# Patient Record
Sex: FEMALE | Race: WHITE | NOT HISPANIC OR LATINO | Employment: OTHER | URBAN - METROPOLITAN AREA
[De-identification: names, ages, dates, MRNs, and addresses within clinical notes are randomized per-mention and may not be internally consistent; named-entity substitution may affect disease eponyms.]

---

## 2017-04-11 ENCOUNTER — OFFICE VISIT (OUTPATIENT)
Dept: NEUROLOGY | Facility: CLINIC | Age: 82
End: 2017-04-11
Payer: MEDICARE

## 2017-04-11 ENCOUNTER — HOSPITAL ENCOUNTER (OUTPATIENT)
Dept: PULMONOLOGY | Facility: HOSPITAL | Age: 82
Discharge: HOME | End: 2017-04-11
Attending: NURSE PRACTITIONER
Payer: MEDICARE

## 2017-04-11 ENCOUNTER — HOSPITAL ENCOUNTER (OUTPATIENT)
Dept: RADIOLOGY | Facility: HOSPITAL | Age: 82
Discharge: HOME | End: 2017-04-11
Attending: NURSE PRACTITIONER
Payer: MEDICARE

## 2017-04-11 VITALS
RESPIRATION RATE: 16 BRPM | HEART RATE: 64 BPM | DIASTOLIC BLOOD PRESSURE: 82 MMHG | WEIGHT: 162.06 LBS | SYSTOLIC BLOOD PRESSURE: 124 MMHG | BODY MASS INDEX: 29.82 KG/M2 | HEIGHT: 62 IN

## 2017-04-11 DIAGNOSIS — R20.0 NUMBNESS OF RIGHT HAND: ICD-10-CM

## 2017-04-11 DIAGNOSIS — M54.2 NECK PAIN: ICD-10-CM

## 2017-04-11 DIAGNOSIS — R55 SYNCOPE, UNSPECIFIED SYNCOPE TYPE: ICD-10-CM

## 2017-04-11 DIAGNOSIS — R29.6 FALLS FREQUENTLY: ICD-10-CM

## 2017-04-11 DIAGNOSIS — M54.5 LOW BACK PAIN, UNSPECIFIED BACK PAIN LATERALITY, UNSPECIFIED CHRONICITY, WITH SCIATICA PRESENCE UNSPECIFIED: ICD-10-CM

## 2017-04-11 DIAGNOSIS — R55 SYNCOPE, UNSPECIFIED SYNCOPE TYPE: Primary | ICD-10-CM

## 2017-04-11 DIAGNOSIS — G25.81 RESTLESS LEG SYNDROME: ICD-10-CM

## 2017-04-11 DIAGNOSIS — R42 DIZZINESS: ICD-10-CM

## 2017-04-11 PROCEDURE — 99999 PR PBB SHADOW E&M-EST. PATIENT-LVL III: CPT | Mod: PBBFAC,,, | Performed by: NURSE PRACTITIONER

## 2017-04-11 PROCEDURE — 72120 X-RAY BEND ONLY L-S SPINE: CPT | Mod: 26,,, | Performed by: RADIOLOGY

## 2017-04-11 PROCEDURE — 93227 XTRNL ECG REC<48 HR R&I: CPT | Mod: ,,, | Performed by: INTERNAL MEDICINE

## 2017-04-11 PROCEDURE — 72040 X-RAY EXAM NECK SPINE 2-3 VW: CPT | Mod: 26,,, | Performed by: RADIOLOGY

## 2017-04-11 PROCEDURE — 72120 X-RAY BEND ONLY L-S SPINE: CPT | Mod: TC

## 2017-04-11 PROCEDURE — 72100 X-RAY EXAM L-S SPINE 2/3 VWS: CPT | Mod: 26,,, | Performed by: RADIOLOGY

## 2017-04-11 PROCEDURE — 93225 XTRNL ECG REC<48 HRS REC: CPT

## 2017-04-11 PROCEDURE — 72040 X-RAY EXAM NECK SPINE 2-3 VW: CPT | Mod: TC

## 2017-04-11 RX ORDER — GLIMEPIRIDE 2 MG/1
TABLET ORAL
Refills: 5 | COMMUNITY
Start: 2017-03-31

## 2017-04-11 RX ORDER — CYANOCOBALAMIN 1000 UG/ML
INJECTION, SOLUTION INTRAMUSCULAR; SUBCUTANEOUS
Refills: 0 | Status: ON HOLD | COMMUNITY
Start: 2017-01-02 | End: 2020-02-03 | Stop reason: HOSPADM

## 2017-04-11 RX ORDER — DIPHENOXYLATE HYDROCHLORIDE AND ATROPINE SULFATE 2.5; .025 MG/1; MG/1
TABLET ORAL
Refills: 0 | Status: ON HOLD | COMMUNITY
Start: 2017-03-28 | End: 2020-02-03 | Stop reason: HOSPADM

## 2017-04-11 RX ORDER — BUPROPION HYDROCHLORIDE 150 MG/1
TABLET ORAL
Refills: 5 | COMMUNITY
Start: 2017-03-07

## 2017-04-11 RX ORDER — SULFAMETHOXAZOLE AND TRIMETHOPRIM 800; 160 MG/1; MG/1
0.5 TABLET ORAL DAILY
Refills: 0 | Status: ON HOLD | COMMUNITY
Start: 2017-03-31 | End: 2020-02-03 | Stop reason: HOSPADM

## 2017-04-11 RX ORDER — METFORMIN HYDROCHLORIDE 500 MG/1
TABLET ORAL
Refills: 5 | COMMUNITY
Start: 2017-03-31

## 2017-04-11 RX ORDER — SIMVASTATIN 20 MG/1
20 TABLET, FILM COATED ORAL NIGHTLY
Refills: 5 | COMMUNITY
Start: 2017-03-31

## 2017-04-11 RX ORDER — METOPROLOL SUCCINATE 50 MG/1
TABLET, EXTENDED RELEASE ORAL
Refills: 5 | COMMUNITY
Start: 2017-03-31

## 2017-04-11 RX ORDER — OXYBUTYNIN CHLORIDE 5 MG/1
5 TABLET ORAL 2 TIMES DAILY
Refills: 5 | COMMUNITY
Start: 2017-03-31

## 2017-04-11 NOTE — LETTER
April 11, 2017      Rosendo Lawrence MD  [address] [facility]  [place] [address]           [place] [facility]  [address] [facility] [address]  Phone: [phone]  Fax: [phone]          Patient: Sandy Uriarte   MR Number: [id number]   YOB: 1934   Date of Visit: 4/11/2017       Dear Dr. Rosendo Lawrence:    Thank you for referring Sandy Uriarte to me for evaluation. Attached you will find relevant portions of my assessment and plan of care.    If you have questions, please do not hesitate to call me. I look forward to following Sandy Uriarte along with you.    Sincerely,    Lida Zhou, HERBERTH    Enclosure  CC:  No Recipients    If you would like to receive this communication electronically, please contact [email or web address] or [phone] to request more information on [employer] access.    For providers and/or their staff who would like to refer a patient to [facility], please contact us through our one-stop-shop provider referral line, [facility], at [phone].    If you feel you have received this communication in error or would no longer like to receive these types of communications, please e-mail [email or web address]

## 2017-04-11 NOTE — MR AVS SNAPSHOT
[place] [facility]  [address] [facility] [address]  Phone: [phone]  Fax: [phone]                  Sandy Uriarte   2017 1:40 PM   Office Visit    Description:  Female : 1934   Provider:  Lida Zhou NP   Department:  [place] [facility]           Reason for Visit     Peripheral Neuropathy     Loss of Consciousness     Dizziness           Diagnoses this Visit        Comments    Syncope, unspecified syncope type    -  Primary     Dizziness         Neck pain         Falls frequently         Restless leg syndrome         Numbness of right hand         Low back pain, unspecified back pain laterality, unspecified chronicity, with sciatica presence unspecified                To Do List           Goals (5 Years of Data)     None      [facility]     [facility] On Call Nurse Care Line -  Assistance  Unless otherwise directed by your provider, please contact [facility], our nurse care line that is available for  assistance.     Registered nurses in the [facility] Center provide: appointment scheduling, clinical advisement, health education, and other advisory services.  Call: [phone] (toll free)               Medications                Verify that the below list of medications is an accurate representation of the medications you are currently taking.  If none reported, the list may be blank. If incorrect, please contact your healthcare provider. Carry this list with you in case of emergency.           Current Medications     buPROPion (WELLBUTRIN XL) 150 MG TB24 tablet 1 daily for mood    cyanocobalamin 1,000 mcg/mL injection Inject 1 mL every 2 weeks by intramuscular route as directed.    glimepiride (AMARYL) 2 MG tablet Take 1 tablet(s) every day by oral route.    metformin (GLUCOPHAGE) 500 MG tablet take one-half tablet by mouth 4 times daily for diabetes    metoprolol succinate (TOPROL-XL) 50 MG 24 hr tablet take one and one-half tablet  "by mouth daily for high blood pressure    oxybutynin (DITROPAN) 5 MG Tab Take 5 mg by mouth 2 (two) times daily.    simvastatin (ZOCOR) 20 MG tablet one tablet by mouth daily    sulfamethoxazole-trimethoprim 800-160mg (BACTRIM DS) 800-160 mg Tab Take 0.5 tablets by mouth once daily.     diphenoxylate-atropine 2.5-0.025 mg (LOMOTIL) 2.5-0.025 mg per tablet TAKE ONE TABLET BY MOUTH FOUR TIMES DAILY AS NEEDED LOOSE BOWELS           Clinical Reference Information           Your Vitals Were     BP Pulse Resp Height Weight BMI    124/82 (BP Location: Left arm, Patient Position: Sitting, BP Method: Manual) 64 16 5' 2" (1.575 m) 73.5 kg (162 lb 0.6 oz) 29.64 kg/m2      Blood Pressure          Most Recent Value    BP  124/82      Allergies as of 4/11/2017     No Known Allergies      Immunizations Administered on Date of Encounter - 4/11/2017     None      Orders Placed During Today's Visit     Future Labs/Procedures Expected by Expires    Creatinine, serum  4/11/2017 6/10/2018    MRI Brain W WO Contrast  4/11/2017 4/11/2018    X-Ray Cervical Spine AP And Lateral  4/11/2017 4/11/2018    X-Ray Lumbar Spine Ap Lateral w/Flex Ext  4/11/2017 4/11/2018    EMG - 4 Extremities  As directed 4/11/2018    Holter monitor - 48 hour  As directed 4/11/2018    Nerve conduction test  As directed 4/12/2018      [facility] Sign-Up     Activating your [facility] account is as easy as 1-2-3!     1) Visit [email or web address], select Sign Up Now, enter this activation code and your date of birth, then select Next.  [email or web address]  Expires: 5/26/2017  2:37 PM      2) Create a username and password to use when you visit [facility] in the future and select a security question in case you lose your password and select Next.    3) Enter your e-mail address and click Sign Up!    Additional Information  If you have questions, please e-mail [email or web address] or call [phone] to talk to our [facility] staff. Remember, [facility] is NOT to be used for " urgent needs. For medical emergencies, dial 911.         Language Assistance Services     ATTENTION: Language assistance services are available, free of charge. Please call [phone].      ATENCIÓN: Si habla severo, tiene a tijerina disposición servicios gratuitos de asistencia lingüística. Llame al [phone].     CHÚ Ý: N?u b?n nói Ti?ng Vi?t, có các d?ch v? h? tr? ngôn ng? mi?n phí dành cho b?n. G?i s? [phone].         [place] [facility] complies with applicable Federal civil rights laws and does not discriminate on the basis of race, color, national origin, age, disability, or sex.

## 2017-04-11 NOTE — PROGRESS NOTES
"Subjective:      Chief Complaint:  Syncope    History of Present Illness  Sandy Uriarte is a 82 y.o. female, referred by Mckenna Ward NP, for evaluation of possible neuropathy, dizziness, and syncope. She has a history of HTN, hyperlipidemia, and thyroid disease.     Her first syncopal episode was in 11/2016, and her second syncopal episode occurred a couple months ago. She became dizzy, fell down, and then "woke up". She denies any bowel or bladder loss, but admits to having confusion afterwards. She denies any palpitations. She denies any dimming of her vision with her dizziness, but does have reduced hearing. One of her episodes occurred after bending over, and her second episode occurred after she finished having a BM and stood up.      While she has only had two syncopal episodes, she becomes dizzy 4-5 days per week. Her dizziness lasts for a couple minutes at a time.     She reports to having jumping to her legs at night, which began in 2012. She denies having any treatment for this. This does keep her awake at night. She has a history of a lumbar surgery in the past, and experiences intermittent lumbar complaints. Her lumbar complaints are worse with twisting motions. She admits to having burning to her feet and reports that she was diagnosed with neuropathy by Dr. Lawrence via a scan in the office.     She does admit to having some neck pain, which began after falling against the wall a couple months ago. She denies any radiation into her BUE, but does admit to dropping items with her right hand.     I have reviewed all of this patient's past medical and surgical histories as well as family and social histories and active allergies and medications as documented in the electronic medical record.    Review of Systems  Review of Systems   Constitutional: Positive for fatigue.   Eyes: Negative for visual disturbance.   Respiratory: Positive for shortness of breath.    Cardiovascular: Negative for palpitations " and leg swelling.   Musculoskeletal: Positive for back pain.   Skin: Negative for rash.   Neurological: Positive for dizziness, tremors, weakness and headaches. Negative for speech difficulty and numbness.   Psychiatric/Behavioral: Positive for dysphoric mood and sleep disturbance. The patient is nervous/anxious.        Objective:       Vitals:    04/11/17 1407   BP: 124/82   Pulse: 64   Resp: 16     Exam:  Gen Appearance, well developed/nourished in no apparent distress  CV: 2+ distal pulses with no edema or swelling  Neuro:  MS: Awake, alert, oriented to place, person, time, situation. Sustains attention. Recent/remote memory intact, Language is full to spontaneous speech/repetition/naming/comprehension. Fund of Knowledge is full  CN: Optic discs are flat with normal vasculature, PERRL, Extraoccular movements and visual fields are full. Normal facial sensation and strength, Hearing symmetric, Tongue and Palate are midline and strong. Shoulder Shrug symmetric and strong.  Motor: Normal bulk, tone, no abnormal movements. 5/5 strength bilateral upper/lower extremities with 2+ reflexes and bilateral plantar response  Sensory: symmetric to light touch, pain, temp, and vibration/proprioception. Romberg negative  Cerebellar: Finger-nose,Heal-shin, Rapid alternating movements intact  Gait: Normal stance, no ataxia    Imaging: no neuroimaging to review  Labs: no recent labs to review; requested recent labs done by PCP.     Assessment:   Sandy Uriarte is a 82 y.o. female, referred by Mckenna Ward NP, for evaluation of possible neuropathy, dizziness, and syncope. She has a history of HTN, hyperlipidemia, and thyroid disease.     Her presentation of her syncopal episodes is suspicious for orthostatic hypotension, vasovagally mediated episodes, as well as for possible autonomic neuropathy or arhythmia.     Plan:     I recommend:   1. MRI Brain with and without contrast to assess for structural abnormalities.   2. 48 hour  Holter to assess for arhythmia.   3. Request labs from PCP, and check a TSH, B12, and Iron Studies, given her dizziness, gait imbalance, and RLS complaints. If her labs are unrevealing, I will consider medication for her RLS; however, if her syncope/dizziness is orthostatic in nature, this could be worsened by dopamine agonists.   4. C-spine X-rays and L-spine X-rays to assess for structural abnormalities.   5. EMG/NCS 4 extremities.     Follow up to be determined at EMG.     CC: Mckenna Ward NP and Dr. Rosendo Lawrence

## 2017-04-18 ENCOUNTER — HOSPITAL ENCOUNTER (OUTPATIENT)
Dept: RADIOLOGY | Facility: HOSPITAL | Age: 82
Discharge: HOME | End: 2017-04-18
Attending: NURSE PRACTITIONER
Payer: MEDICARE

## 2017-04-18 DIAGNOSIS — R55 SYNCOPE, UNSPECIFIED SYNCOPE TYPE: ICD-10-CM

## 2017-04-18 DIAGNOSIS — R42 DIZZINESS: ICD-10-CM

## 2017-04-18 PROCEDURE — 70553 MRI BRAIN STEM W/O & W/DYE: CPT | Mod: TC

## 2017-04-18 PROCEDURE — 25500020 PHARM REV CODE 255: Performed by: NURSE PRACTITIONER

## 2017-04-18 PROCEDURE — 70553 MRI BRAIN STEM W/O & W/DYE: CPT | Mod: 26,,, | Performed by: RADIOLOGY

## 2017-04-18 PROCEDURE — A9585 GADOBUTROL INJECTION: HCPCS | Performed by: NURSE PRACTITIONER

## 2017-04-18 RX ORDER — GADOBUTROL 604.72 MG/ML
7 INJECTION INTRAVENOUS
Status: COMPLETED | OUTPATIENT
Start: 2017-04-18 | End: 2017-04-18

## 2017-04-18 RX ADMIN — GADOBUTROL 7 ML: 604.72 INJECTION INTRAVENOUS at 02:04

## 2017-05-12 ENCOUNTER — PROCEDURE VISIT (OUTPATIENT)
Dept: NEUROLOGY | Facility: CLINIC | Age: 82
End: 2017-05-12
Payer: MEDICARE

## 2017-05-12 DIAGNOSIS — R29.6 FALLS FREQUENTLY: ICD-10-CM

## 2017-05-12 DIAGNOSIS — I69.90 LATE EFFECTS OF CVA (CEREBROVASCULAR ACCIDENT): Primary | ICD-10-CM

## 2017-05-12 DIAGNOSIS — G56.03 BILATERAL CARPAL TUNNEL SYNDROME: ICD-10-CM

## 2017-05-12 DIAGNOSIS — G25.81 RESTLESS LEG SYNDROME: ICD-10-CM

## 2017-05-12 DIAGNOSIS — M54.2 NECK PAIN: ICD-10-CM

## 2017-05-12 DIAGNOSIS — M54.5 LOW BACK PAIN, UNSPECIFIED BACK PAIN LATERALITY, UNSPECIFIED CHRONICITY, WITH SCIATICA PRESENCE UNSPECIFIED: ICD-10-CM

## 2017-05-12 DIAGNOSIS — R20.0 NUMBNESS OF RIGHT HAND: ICD-10-CM

## 2017-05-12 DIAGNOSIS — I67.2 CEREBRAL ATHEROSCLEROSIS: ICD-10-CM

## 2017-05-12 DIAGNOSIS — E11.42 DIABETIC POLYNEUROPATHY ASSOCIATED WITH TYPE 2 DIABETES MELLITUS: ICD-10-CM

## 2017-05-12 PROCEDURE — 95864 NEEDLE EMG 4 EXTREMITIES: CPT | Mod: PBBFAC | Performed by: PSYCHIATRY & NEUROLOGY

## 2017-05-12 PROCEDURE — 95913 NRV CNDJ TEST 13/> STUDIES: CPT | Mod: 26,S$PBB | Performed by: PSYCHIATRY & NEUROLOGY

## 2017-05-12 NOTE — PROCEDURES
EMG - 4 Extremities  Date/Time: 5/12/2017 2:32 PM  Performed by: EDUARDO JENNINGS  Authorized by: APRIL LAYNE     Nerve conduction test  Date/Time: 5/12/2017 2:32 PM  Performed by: EDUARDO JENNINGS  Authorized by: APRIL LAYNE       REPORT OF EMG and NERVE CONDUCTION STUDY    Name: Sandy Uriarte  Date of Study:  5/12/17  Referring Provider: JARRED Layne  Test Performed by:  MD Jose  Full Values Attached  Informed Consent Scanned.   No anesthesia used.   Amount of Blood Loss: none. The patient tolerated this procedure well.       Informed consent was obtained prior to performing this study. Two patient identifiers were confirmed with the patient prior to performing this study. A time out to determine correct patient and and agreement on procedure performed was conducted prior to the concentric needle examination.    Reason for the study:  Numb feet and hands, poor balance      Findings:   Nerve conduction studies of the right  median (motor and sensory)nerves, left median sensory nerve,right ulnar (motor and sensory) nerves, right radial sensory nerve, bilateral peroneal motor, bilateral tibial motor, bilateral sural nerves and bilateral H-reflexes were performed. Median palm to wrist was mildly prolonged to 2.6ms on the right and 2.7ms on the left.  Amplitudes were reduced without conduction block throughout in the legs more than hands. However, distal latencies, conduction velocities were more preserved. H reflexes were absent  EMG of selected muscles of the bilateral arms and bilateral legs were performed as indicated on the attached sheets.  Lumbar paraspinal muscle sampling was not done due to scar from prior surgery and cervical paraspinal muscle sampling was not done as patient could not well cooperate/ could not be appropriately positioned. This part of the test was somewhat limited due to patient's difficulty with cooperation.  However,   Insertional activity was normal without  fasciculation or fibrillation, normal sized and phasia of motor units.      Impression:  Abnormal Study secondary to the Presence of:    1. 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 Mild Bilateral Carpal Tunnel Syndrome  3. This study was not adequate to rule out radicular disease due to limitations noted above. Clinical and imaging correlation suggested    Jude Garcia M.D. [facility].     Recommendations (reviewed at this visit)  1. She will continue with efforts on DM control. She never reported for B12 level or ferritin level as she states she takes B12 and her RLS is improved. She states pain is improved and she has not had any further syncope. For her imbalance due in part to neuropathy, I recommend home health with PT consult. She declines. Suggested she use a walker at all times  2. Wear Brace for CTS nightly PRN CTS  3. I reviewed her large amount of left frontal/parietal atrophy on MRI. Etiology is not clear. Stroke is in differential. She recalls a spell 1.5 years ago of aphasia after a fall which could be related. Check Carotid US and Echo/ continue ASA started since change on MRI noted. This could also contribute to poor balance  RTC 12 weeks.

## 2017-05-12 NOTE — MR AVS SNAPSHOT
[place] [facility]  [address] [facility] [address]  Phone: [phone]  Fax: [phone]                  Sandy Uriarte   2017 2:00 PM   Procedure visit    Description:  Female : 1934   Provider:  Jude Garcia MD   Department:  [place] [facility]           Diagnoses this Visit        Comments    Late effects of CVA (cerebrovascular accident)    -  Primary     Falls frequently         Numbness of right hand         Low back pain, unspecified back pain laterality, unspecified chronicity, with sciatica presence unspecified         Cerebral atherosclerosis                To Do List           Goals (5 Years of Data)     None      Follow-Up and Disposition     Return in about 3 months (around 2017).      [facility] On Call     [facility] On Call Nurse Care Line -  Assistance  Unless otherwise directed by your provider, please contact [facility], our nurse care line that is available for  assistance.     Registered nurses in the [facility] On Call Center provide: appointment scheduling, clinical advisement, health education, and other advisory services.  Call: [phone] (toll free)               Medications                Verify that the below list of medications is an accurate representation of the medications you are currently taking.  If none reported, the list may be blank. If incorrect, please contact your healthcare provider. Carry this list with you in case of emergency.           Current Medications     buPROPion (WELLBUTRIN XL) 150 MG TB24 tablet 1 daily for mood    cyanocobalamin 1,000 mcg/mL injection Inject 1 mL every 2 weeks by intramuscular route as directed.    diphenoxylate-atropine 2.5-0.025 mg (LOMOTIL) 2.5-0.025 mg per tablet TAKE ONE TABLET BY MOUTH FOUR TIMES DAILY AS NEEDED LOOSE BOWELS    glimepiride (AMARYL) 2 MG tablet Take 1 tablet(s) every day by oral route.    metformin (GLUCOPHAGE) 500 MG tablet take one-half tablet by mouth 4  times daily for diabetes    metoprolol succinate (TOPROL-XL) 50 MG 24 hr tablet take one and one-half tablet by mouth daily for high blood pressure    oxybutynin (DITROPAN) 5 MG Tab Take 5 mg by mouth 2 (two) times daily.    simvastatin (ZOCOR) 20 MG tablet one tablet by mouth daily    sulfamethoxazole-trimethoprim 800-160mg (BACTRIM DS) 800-160 mg Tab Take 0.5 tablets by mouth once daily.            Clinical Reference Information           Allergies as of 5/12/2017     No Known Allergies      Immunizations Administered on Date of Encounter - 5/12/2017     None      Orders Placed During Today's Visit      Normal Orders This Visit    EMG - 4 Extremities     Nerve conduction test     Future Labs/Procedures Expected by Expires    US Carotid Bilateral  5/12/2017 5/12/2018    2D echo with color flow doppler and bubble contrast  As directed 5/12/2018      Language Assistance Services     ATTENTION: Language assistance services are available, free of charge. Please call [phone].      ATENCIÓN: Si habla severo, tiene a tijerina disposición servicios gratuitos de asistencia lingüística. Llame al [phone].     [facility] Ý: N?u b?n nói Ti?ng Vi?t, có các d?ch v? h? tr? ngôn ng? mi?n phí dành cho b?n. G?i s? [phone].         [place] [facility] complies with applicable Federal civil rights laws and does not discriminate on the basis of race, color, national origin, age, disability, or sex.

## 2017-05-22 ENCOUNTER — HOSPITAL ENCOUNTER (OUTPATIENT)
Dept: RADIOLOGY | Facility: HOSPITAL | Age: 82
Discharge: HOME | End: 2017-05-22
Attending: PSYCHIATRY & NEUROLOGY
Payer: MEDICARE

## 2017-05-22 ENCOUNTER — HOSPITAL ENCOUNTER (OUTPATIENT)
Dept: PULMONOLOGY | Facility: HOSPITAL | Age: 82
Discharge: HOME | End: 2017-05-22
Attending: PSYCHIATRY & NEUROLOGY
Payer: MEDICARE

## 2017-05-22 DIAGNOSIS — I67.2 CEREBRAL ATHEROSCLEROSIS: ICD-10-CM

## 2017-05-22 DIAGNOSIS — I69.90 LATE EFFECTS OF CVA (CEREBROVASCULAR ACCIDENT): ICD-10-CM

## 2017-05-22 DIAGNOSIS — I51.7 CARDIOMEGALY: ICD-10-CM

## 2017-05-22 DIAGNOSIS — R93.1 ABNORMAL ECHOCARDIOGRAM: Primary | ICD-10-CM

## 2017-05-22 LAB
DIASTOLIC DYSFUNCTION: NO
MITRAL VALVE MOBILITY: NORMAL
RETIRED EF AND QEF - SEE NOTES: 55 (ref 55–65)

## 2017-05-22 PROCEDURE — 96374 THER/PROPH/DIAG INJ IV PUSH: CPT

## 2017-05-22 PROCEDURE — 93306 TTE W/DOPPLER COMPLETE: CPT | Mod: 26,,, | Performed by: INTERNAL MEDICINE

## 2017-05-22 PROCEDURE — 93880 EXTRACRANIAL BILAT STUDY: CPT | Mod: TC

## 2017-05-22 PROCEDURE — 93880 EXTRACRANIAL BILAT STUDY: CPT | Mod: 26,,, | Performed by: RADIOLOGY

## 2018-01-05 ENCOUNTER — HOSPITAL ENCOUNTER (OUTPATIENT)
Dept: RADIOLOGY | Facility: HOSPITAL | Age: 83
Discharge: HOME | End: 2018-01-05
Attending: FAMILY MEDICINE
Payer: MEDICARE

## 2018-01-05 DIAGNOSIS — R41.3 MEMORY IMPAIRMENT: ICD-10-CM

## 2018-01-05 PROCEDURE — 25500020 PHARM REV CODE 255: Performed by: FAMILY MEDICINE

## 2018-01-05 PROCEDURE — A9585 GADOBUTROL INJECTION: HCPCS | Performed by: FAMILY MEDICINE

## 2018-01-05 PROCEDURE — 70553 MRI BRAIN STEM W/O & W/DYE: CPT | Mod: 26,,, | Performed by: RADIOLOGY

## 2018-01-05 PROCEDURE — 70553 MRI BRAIN STEM W/O & W/DYE: CPT | Mod: TC

## 2018-01-05 RX ORDER — GADOBUTROL 604.72 MG/ML
7 INJECTION INTRAVENOUS
Status: COMPLETED | OUTPATIENT
Start: 2018-01-05 | End: 2018-01-05

## 2018-01-05 RX ADMIN — GADOBUTROL 7 ML: 604.72 INJECTION INTRAVENOUS at 01:01

## 2020-01-31 ENCOUNTER — HOSPITAL ENCOUNTER (INPATIENT)
Facility: HOSPITAL | Age: 85
LOS: 3 days | Discharge: HOME HEALTH | DRG: 281 | End: 2020-02-03
Attending: HOSPITALIST | Admitting: HOSPITALIST
Payer: MEDICARE

## 2020-01-31 DIAGNOSIS — I21.4 NSTEMI (NON-ST ELEVATED MYOCARDIAL INFARCTION): ICD-10-CM

## 2020-01-31 DIAGNOSIS — K52.9 ACUTE COLITIS: ICD-10-CM

## 2020-01-31 DIAGNOSIS — G45.9 TIA (TRANSIENT ISCHEMIC ATTACK): Primary | ICD-10-CM

## 2020-01-31 DIAGNOSIS — R55 SYNCOPE, UNSPECIFIED SYNCOPE TYPE: ICD-10-CM

## 2020-01-31 DIAGNOSIS — R42 DIZZINESS: ICD-10-CM

## 2020-01-31 PROBLEM — R93.5 ABNORMAL CT OF THE ABDOMEN: Status: ACTIVE | Noted: 2020-01-31

## 2020-01-31 PROBLEM — E07.9 THYROID DYSFUNCTION: Status: ACTIVE | Noted: 2020-01-31

## 2020-01-31 PROBLEM — K22.4 ESOPHAGEAL DYSMOTILITY: Status: ACTIVE | Noted: 2020-01-31

## 2020-01-31 PROBLEM — I10 ESSENTIAL HYPERTENSION: Status: ACTIVE | Noted: 2020-01-31

## 2020-01-31 PROBLEM — E11.9 DIABETES MELLITUS, TYPE 2: Status: ACTIVE | Noted: 2020-01-31

## 2020-01-31 LAB
ABO + RH BLD: NORMAL
ANION GAP SERPL CALC-SCNC: 13 MMOL/L (ref 8–16)
BASOPHILS # BLD AUTO: 0.05 K/UL (ref 0–0.2)
BASOPHILS NFR BLD: 0.4 % (ref 0–1.9)
BLD GP AB SCN CELLS X3 SERPL QL: NORMAL
BNP SERPL-MCNC: 492 PG/ML (ref 0–99)
BUN SERPL-MCNC: 8 MG/DL (ref 8–23)
CALCIUM SERPL-MCNC: 8.4 MG/DL (ref 8.7–10.5)
CHLORIDE SERPL-SCNC: 102 MMOL/L (ref 95–110)
CHOLEST SERPL-MCNC: 77 MG/DL (ref 120–199)
CHOLEST/HDLC SERPL: 2.4 {RATIO} (ref 2–5)
CO2 SERPL-SCNC: 21 MMOL/L (ref 23–29)
CREAT SERPL-MCNC: 0.8 MG/DL (ref 0.5–1.4)
DIFFERENTIAL METHOD: ABNORMAL
EOSINOPHIL # BLD AUTO: 0.1 K/UL (ref 0–0.5)
EOSINOPHIL NFR BLD: 0.6 % (ref 0–8)
ERYTHROCYTE [DISTWIDTH] IN BLOOD BY AUTOMATED COUNT: 12.8 % (ref 11.5–14.5)
ERYTHROCYTE [SEDIMENTATION RATE] IN BLOOD BY WESTERGREN METHOD: 24 MM/HR (ref 0–20)
EST. GFR  (AFRICAN AMERICAN): >60 ML/MIN/1.73 M^2
EST. GFR  (NON AFRICAN AMERICAN): >60 ML/MIN/1.73 M^2
GLUCOSE SERPL-MCNC: 136 MG/DL (ref 70–110)
HCT VFR BLD AUTO: 34.5 % (ref 37–48.5)
HDLC SERPL-MCNC: 32 MG/DL (ref 40–75)
HDLC SERPL: 41.6 % (ref 20–50)
HGB BLD-MCNC: 11.7 G/DL (ref 12–16)
IMM GRANULOCYTES # BLD AUTO: 0.16 K/UL (ref 0–0.04)
IMM GRANULOCYTES NFR BLD AUTO: 1.2 % (ref 0–0.5)
LDLC SERPL CALC-MCNC: 17.2 MG/DL (ref 63–159)
LYMPHOCYTES # BLD AUTO: 3.2 K/UL (ref 1–4.8)
LYMPHOCYTES NFR BLD: 24.7 % (ref 18–48)
MCH RBC QN AUTO: 28.8 PG (ref 27–31)
MCHC RBC AUTO-ENTMCNC: 33.9 G/DL (ref 32–36)
MCV RBC AUTO: 85 FL (ref 82–98)
MONOCYTES # BLD AUTO: 1 K/UL (ref 0.3–1)
MONOCYTES NFR BLD: 7.3 % (ref 4–15)
NEUTROPHILS # BLD AUTO: 8.6 K/UL (ref 1.8–7.7)
NEUTROPHILS NFR BLD: 65.8 % (ref 38–73)
NONHDLC SERPL-MCNC: 45 MG/DL
NRBC BLD-RTO: 0 /100 WBC
PLATELET # BLD AUTO: 195 K/UL (ref 150–350)
PMV BLD AUTO: 12.6 FL (ref 9.2–12.9)
POCT GLUCOSE: 156 MG/DL (ref 70–110)
POTASSIUM SERPL-SCNC: 3.7 MMOL/L (ref 3.5–5.1)
RBC # BLD AUTO: 4.06 M/UL (ref 4–5.4)
SODIUM SERPL-SCNC: 136 MMOL/L (ref 136–145)
T4 FREE SERPL-MCNC: 1.22 NG/DL (ref 0.71–1.51)
TRIGL SERPL-MCNC: 139 MG/DL (ref 30–150)
TROPONIN I SERPL DL<=0.01 NG/ML-MCNC: 0.13 NG/ML (ref 0–0.03)
TROPONIN I SERPL DL<=0.01 NG/ML-MCNC: 0.13 NG/ML (ref 0–0.03)
TSH SERPL DL<=0.005 MIU/L-ACNC: 0.91 UIU/ML (ref 0.4–4)
WBC # BLD AUTO: 13.1 K/UL (ref 3.9–12.7)

## 2020-01-31 PROCEDURE — 80061 LIPID PANEL: CPT

## 2020-01-31 PROCEDURE — 25000003 PHARM REV CODE 250: Performed by: HOSPITALIST

## 2020-01-31 PROCEDURE — 86141 C-REACTIVE PROTEIN HS: CPT

## 2020-01-31 PROCEDURE — 84443 ASSAY THYROID STIM HORMONE: CPT

## 2020-01-31 PROCEDURE — 36415 COLL VENOUS BLD VENIPUNCTURE: CPT

## 2020-01-31 PROCEDURE — 86850 RBC ANTIBODY SCREEN: CPT

## 2020-01-31 PROCEDURE — 21400001 HC TELEMETRY ROOM

## 2020-01-31 PROCEDURE — 83036 HEMOGLOBIN GLYCOSYLATED A1C: CPT

## 2020-01-31 PROCEDURE — 85652 RBC SED RATE AUTOMATED: CPT

## 2020-01-31 PROCEDURE — 84484 ASSAY OF TROPONIN QUANT: CPT

## 2020-01-31 PROCEDURE — 84439 ASSAY OF FREE THYROXINE: CPT

## 2020-01-31 PROCEDURE — 83880 ASSAY OF NATRIURETIC PEPTIDE: CPT

## 2020-01-31 PROCEDURE — 84481 FREE ASSAY (FT-3): CPT

## 2020-01-31 PROCEDURE — 80048 BASIC METABOLIC PNL TOTAL CA: CPT

## 2020-01-31 PROCEDURE — 85025 COMPLETE CBC W/AUTO DIFF WBC: CPT

## 2020-01-31 PROCEDURE — 94799 UNLISTED PULMONARY SVC/PX: CPT

## 2020-01-31 RX ORDER — BUPROPION HYDROCHLORIDE 150 MG/1
150 TABLET ORAL DAILY
Status: DISCONTINUED | OUTPATIENT
Start: 2020-02-01 | End: 2020-02-03 | Stop reason: HOSPADM

## 2020-01-31 RX ORDER — IBUPROFEN 200 MG
16 TABLET ORAL
Status: DISCONTINUED | OUTPATIENT
Start: 2020-01-31 | End: 2020-02-03 | Stop reason: HOSPADM

## 2020-01-31 RX ORDER — OXYCODONE AND ACETAMINOPHEN 10; 325 MG/1; MG/1
1 TABLET ORAL EVERY 6 HOURS PRN
Status: DISCONTINUED | OUTPATIENT
Start: 2020-01-31 | End: 2020-02-03 | Stop reason: HOSPADM

## 2020-01-31 RX ORDER — SIMVASTATIN 10 MG/1
20 TABLET, FILM COATED ORAL NIGHTLY
Status: DISCONTINUED | OUTPATIENT
Start: 2020-01-31 | End: 2020-02-03 | Stop reason: HOSPADM

## 2020-01-31 RX ORDER — NITROGLYCERIN 0.4 MG/1
0.4 TABLET SUBLINGUAL EVERY 5 MIN PRN
Status: DISCONTINUED | OUTPATIENT
Start: 2020-01-31 | End: 2020-02-03 | Stop reason: HOSPADM

## 2020-01-31 RX ORDER — ONDANSETRON 2 MG/ML
4 INJECTION INTRAMUSCULAR; INTRAVENOUS EVERY 8 HOURS PRN
Status: DISCONTINUED | OUTPATIENT
Start: 2020-01-31 | End: 2020-02-03 | Stop reason: HOSPADM

## 2020-01-31 RX ORDER — INSULIN ASPART 100 [IU]/ML
1-10 INJECTION, SOLUTION INTRAVENOUS; SUBCUTANEOUS
Status: DISCONTINUED | OUTPATIENT
Start: 2020-01-31 | End: 2020-02-03 | Stop reason: HOSPADM

## 2020-01-31 RX ORDER — ASPIRIN 81 MG/1
81 TABLET ORAL DAILY
Status: DISCONTINUED | OUTPATIENT
Start: 2020-02-01 | End: 2020-02-03 | Stop reason: HOSPADM

## 2020-01-31 RX ORDER — SODIUM CHLORIDE 0.9 % (FLUSH) 0.9 %
10 SYRINGE (ML) INJECTION
Status: DISCONTINUED | OUTPATIENT
Start: 2020-01-31 | End: 2020-02-03 | Stop reason: HOSPADM

## 2020-01-31 RX ORDER — OXYBUTYNIN CHLORIDE 5 MG/1
5 TABLET ORAL 2 TIMES DAILY
Status: DISCONTINUED | OUTPATIENT
Start: 2020-01-31 | End: 2020-02-03 | Stop reason: HOSPADM

## 2020-01-31 RX ORDER — METOPROLOL SUCCINATE 50 MG/1
50 TABLET, EXTENDED RELEASE ORAL DAILY
Status: DISCONTINUED | OUTPATIENT
Start: 2020-02-01 | End: 2020-02-02

## 2020-01-31 RX ORDER — POLYETHYLENE GLYCOL 3350 17 G/17G
17 POWDER, FOR SOLUTION ORAL DAILY
Status: DISCONTINUED | OUTPATIENT
Start: 2020-02-01 | End: 2020-02-03 | Stop reason: HOSPADM

## 2020-01-31 RX ORDER — MORPHINE SULFATE 10 MG/ML
5 INJECTION INTRAMUSCULAR; INTRAVENOUS; SUBCUTANEOUS EVERY 4 HOURS PRN
Status: DISCONTINUED | OUTPATIENT
Start: 2020-01-31 | End: 2020-02-03 | Stop reason: HOSPADM

## 2020-01-31 RX ORDER — IBUPROFEN 200 MG
24 TABLET ORAL
Status: DISCONTINUED | OUTPATIENT
Start: 2020-01-31 | End: 2020-02-03 | Stop reason: HOSPADM

## 2020-01-31 RX ORDER — ACETAMINOPHEN 325 MG/1
650 TABLET ORAL EVERY 6 HOURS PRN
Status: DISCONTINUED | OUTPATIENT
Start: 2020-01-31 | End: 2020-02-03 | Stop reason: HOSPADM

## 2020-01-31 RX ORDER — GLUCAGON 1 MG
1 KIT INJECTION
Status: DISCONTINUED | OUTPATIENT
Start: 2020-01-31 | End: 2020-02-03 | Stop reason: HOSPADM

## 2020-01-31 RX ORDER — OXYCODONE AND ACETAMINOPHEN 5; 325 MG/1; MG/1
1 TABLET ORAL EVERY 6 HOURS PRN
Status: DISCONTINUED | OUTPATIENT
Start: 2020-01-31 | End: 2020-02-03 | Stop reason: HOSPADM

## 2020-01-31 RX ADMIN — SIMVASTATIN 20 MG: 10 TABLET, FILM COATED ORAL at 09:01

## 2020-01-31 RX ADMIN — OXYBUTYNIN CHLORIDE 5 MG: 5 TABLET ORAL at 09:01

## 2020-01-31 NOTE — PLAN OF CARE
Outside Transfer Acceptance Note        Patients name/MRN:    Sandy Uriarte, MRN: [id number]     Referring Physician or Mid-Level provider giving report: Dr. Roesndo Tinoco MD     Referral Facility:  [facility], inpatient, admit date of 1/27/20 (4 days)     Date/Time of Acceptance:  1/30/20 at 5pm     Accepting Physician for admission to hospital: ROMELIA Patel MD ()     Accepting facility:  [facility], inpatient, tele     Consulting Physicians from [facility] involved in case:  Dr. Teja Ling MD, ENT [facility]  Dr. Lety Rosario MD, Hospitalist at [facility]    Reason for transfer request:     Ms. Uriarte is an 84yo lady with a past medical history of DM2, CVA and hypothyroidism.  She presented to [facility] on 1/27 complaining of nausea, vomiting and anorexia for several days prior to admission.  Accompanying this was some generalized abdominal pain.  This led to a CT abdomen which revealed thickening of the transverse, descending, and sigmoid colon consistent with colitis.  She had a WBC count of 15 at admission.  She was admitted for infectious colitis and UTI and was placed on Cipro and Flagyl IV.  Rocephin 1g iv q24 was added on 1/28 for possible UTI (urine sample wasnt good, but she had K.pneumo recently on an old cx sensitive to this. Cipro was stopped today due to confusion.    Her other complaint was some dysphagia. She underwent an esophagram on 1/29 which revealed dysmotility without evidence for esophageal obstruction.  She is now combative and confused as well.  She had a CT head with no focal, acute changes.    The sending MD is also concerned due to developing on Tuesday night with some burning in her chest, but had a normal EKG.  While she was getting her esophagram, she developed some flip Ts anterior on the telemetry, so a repeat EKG, though she had no symptoms.  Her troponin was minimally elevated to max of 0.72 and came down to 0.51 on the following exams.  She was started on  ASA and therapeutic Lovenox.  She was also begun on beta blockers.    It does take 3-5 days to get the results of an echo, so this was not done.    To Do List upon arrival:    Consult Cardiology and check Echo  Continue therapy for colitis  Patient now has metabolic encephalopathy      VS:  P80, RR16, T98.1F, O2 SATS 98% 123/59    Past Medical History/Surgical History:   Past Medical History:   Diagnosis Date    Cancer     breast cancer    Diabetes mellitus     Hypertension     Neuropathy     Other and unspecified hyperlipidemia     Thyroid disease        Allergies: NKDA    Current Facility-Administered Medications: See scanned MAR in media tab    Labs from 1/31: , K 3.7, , GLUC 148, CO2 22, BUN 10, CR 0.79, CA 8, TP 5.7, ALB 3.1, AP 49, ALT 8, AST 12, TB 0.2, WBC 12.4, HG 12.2, HCT 37, , TROP 0.72à0.51, CK 45, MB 3.8, LIPASE 10      Imaging:   As per HPI  CT head: moderate to severe diffuse cerebral and cerebellar atrophy with dilated ventricles.  Periventricular small vessel ischemic change.  Intracranial arterial atherosclerosis.       ROMELIA Patel MD  Department of Hospital Medicine  [facility]/   [phone]

## 2020-02-01 PROBLEM — E83.42 HYPOMAGNESEMIA: Status: ACTIVE | Noted: 2020-02-01

## 2020-02-01 PROBLEM — G45.9 TIA (TRANSIENT ISCHEMIC ATTACK): Status: ACTIVE | Noted: 2020-02-01

## 2020-02-01 LAB
ALBUMIN SERPL BCP-MCNC: 2.8 G/DL (ref 3.5–5.2)
ALP SERPL-CCNC: 50 U/L (ref 55–135)
ALT SERPL W/O P-5'-P-CCNC: 14 U/L (ref 10–44)
ANION GAP SERPL CALC-SCNC: 11 MMOL/L (ref 8–16)
AORTIC ROOT ANNULUS: 2.4 CM
AORTIC VALVE CUSP SEPERATION: 1.55 CM
ASCENDING AORTA: 2.38 CM
AST SERPL-CCNC: 20 U/L (ref 10–40)
AV INDEX (PROSTH): 1.03
AV MEAN GRADIENT: 3 MMHG
AV PEAK GRADIENT: 5 MMHG
AV VALVE AREA: 2.24 CM2
AV VELOCITY RATIO: 0.96
BASOPHILS # BLD AUTO: 0.05 K/UL (ref 0–0.2)
BASOPHILS NFR BLD: 0.4 % (ref 0–1.9)
BILIRUB SERPL-MCNC: 0.2 MG/DL (ref 0.1–1)
BSA FOR ECHO PROCEDURE: 1.54 M2
BUN SERPL-MCNC: 7 MG/DL (ref 8–23)
CALCIUM SERPL-MCNC: 8.5 MG/DL (ref 8.7–10.5)
CHLORIDE SERPL-SCNC: 102 MMOL/L (ref 95–110)
CO2 SERPL-SCNC: 24 MMOL/L (ref 23–29)
CREAT SERPL-MCNC: 0.8 MG/DL (ref 0.5–1.4)
CRP SERPL-MCNC: 19.09 MG/L (ref 0–3.19)
CV ECHO LV RWT: 0.34 CM
DIFFERENTIAL METHOD: ABNORMAL
DOP CALC AO PEAK VEL: 1.15 M/S
DOP CALC AO VTI: 21.4 CM
DOP CALC LVOT AREA: 2.2 CM2
DOP CALC LVOT DIAMETER: 1.66 CM
DOP CALC LVOT PEAK VEL: 1.1 M/S
DOP CALC LVOT STROKE VOLUME: 47.87 CM3
DOP CALCLVOT PEAK VEL VTI: 22.13 CM
E WAVE DECELERATION TIME: 93.41 MSEC
E/A RATIO: 0.5
E/E' RATIO: 14.29 M/S
ECHO LV POSTERIOR WALL: 0.83 CM (ref 0.6–1.1)
EOSINOPHIL # BLD AUTO: 0.1 K/UL (ref 0–0.5)
EOSINOPHIL NFR BLD: 0.7 % (ref 0–8)
ERYTHROCYTE [DISTWIDTH] IN BLOOD BY AUTOMATED COUNT: 12.7 % (ref 11.5–14.5)
EST. GFR  (AFRICAN AMERICAN): >60 ML/MIN/1.73 M^2
EST. GFR  (NON AFRICAN AMERICAN): >60 ML/MIN/1.73 M^2
ESTIMATED AVG GLUCOSE: 114 MG/DL (ref 68–131)
FOLATE SERPL-MCNC: 4.1 NG/ML (ref 4–24)
FRACTIONAL SHORTENING: 31 % (ref 28–44)
GLUCOSE SERPL-MCNC: 164 MG/DL (ref 70–110)
HBA1C MFR BLD HPLC: 5.6 % (ref 4–5.6)
HCT VFR BLD AUTO: 35 % (ref 37–48.5)
HGB BLD-MCNC: 11.8 G/DL (ref 12–16)
IMM GRANULOCYTES # BLD AUTO: 0.14 K/UL (ref 0–0.04)
IMM GRANULOCYTES NFR BLD AUTO: 1 % (ref 0–0.5)
INTERVENTRICULAR SEPTUM: 0.92 CM (ref 0.6–1.1)
IVRT: 0.05 MSEC
LA MAJOR: 4.13 CM
LA MINOR: 4.05 CM
LA WIDTH: 3.39 CM
LEFT ATRIUM SIZE: 3.1 CM
LEFT ATRIUM VOLUME INDEX: 24 ML/M2
LEFT ATRIUM VOLUME: 36.53 CM3
LEFT INTERNAL DIMENSION IN SYSTOLE: 3.32 CM (ref 2.1–4)
LEFT VENTRICLE DIASTOLIC VOLUME INDEX: 72.17 ML/M2
LEFT VENTRICLE DIASTOLIC VOLUME: 109.83 ML
LEFT VENTRICLE MASS INDEX: 95 G/M2
LEFT VENTRICLE SYSTOLIC VOLUME INDEX: 29.5 ML/M2
LEFT VENTRICLE SYSTOLIC VOLUME: 44.87 ML
LEFT VENTRICULAR INTERNAL DIMENSION IN DIASTOLE: 4.84 CM (ref 3.5–6)
LEFT VENTRICULAR MASS: 144.38 G
LV LATERAL E/E' RATIO: 12.5 M/S
LV SEPTAL E/E' RATIO: 16.67 M/S
LYMPHOCYTES # BLD AUTO: 2.9 K/UL (ref 1–4.8)
LYMPHOCYTES NFR BLD: 21.4 % (ref 18–48)
MAGNESIUM SERPL-MCNC: 0.9 MG/DL (ref 1.6–2.6)
MAGNESIUM SERPL-MCNC: 1.5 MG/DL (ref 1.6–2.6)
MCH RBC QN AUTO: 28.4 PG (ref 27–31)
MCHC RBC AUTO-ENTMCNC: 33.7 G/DL (ref 32–36)
MCV RBC AUTO: 84 FL (ref 82–98)
MONOCYTES # BLD AUTO: 1 K/UL (ref 0.3–1)
MONOCYTES NFR BLD: 7.8 % (ref 4–15)
MV PEAK A VEL: 1 M/S
MV PEAK E VEL: 0.5 M/S
NEUTROPHILS # BLD AUTO: 9.2 K/UL (ref 1.8–7.7)
NEUTROPHILS NFR BLD: 68.7 % (ref 38–73)
NRBC BLD-RTO: 0 /100 WBC
OB PNL STL: NEGATIVE
PHOSPHATE SERPL-MCNC: 2.3 MG/DL (ref 2.7–4.5)
PISA TR MAX VEL: 2.28 M/S
PLATELET # BLD AUTO: 191 K/UL (ref 150–350)
PMV BLD AUTO: 11.9 FL (ref 9.2–12.9)
POCT GLUCOSE: 172 MG/DL (ref 70–110)
POCT GLUCOSE: 183 MG/DL (ref 70–110)
POCT GLUCOSE: 218 MG/DL (ref 70–110)
POCT GLUCOSE: 256 MG/DL (ref 70–110)
POTASSIUM SERPL-SCNC: 3.5 MMOL/L (ref 3.5–5.1)
PREALB SERPL-MCNC: 12 MG/DL (ref 20–43)
PROCALCITONIN SERPL IA-MCNC: 0.04 NG/ML
PROT SERPL-MCNC: 5.8 G/DL (ref 6–8.4)
PV PEAK VELOCITY: 1.25 CM/S
RA MAJOR: 3.35 CM
RA PRESSURE: 8 MMHG
RA WIDTH: 2.24 CM
RBC # BLD AUTO: 4.15 M/UL (ref 4–5.4)
RIGHT VENTRICULAR END-DIASTOLIC DIMENSION: 2.2 CM
RV TISSUE DOPPLER FREE WALL SYSTOLIC VELOCITY 1 (APICAL 4 CHAMBER VIEW): 15.55 CM/S
SINUS: 2.48 CM
SODIUM SERPL-SCNC: 137 MMOL/L (ref 136–145)
STJ: 1.98 CM
T3FREE SERPL-MCNC: 1.4 PG/ML (ref 2.3–4.2)
TDI LATERAL: 0.04 M/S
TDI SEPTAL: 0.03 M/S
TDI: 0.04 M/S
TR MAX PG: 21 MMHG
TRICUSPID ANNULAR PLANE SYSTOLIC EXCURSION: 2.18 CM
TROPONIN I SERPL DL<=0.01 NG/ML-MCNC: 0.11 NG/ML (ref 0–0.03)
TROPONIN I SERPL DL<=0.01 NG/ML-MCNC: 0.12 NG/ML (ref 0–0.03)
TV REST PULMONARY ARTERY PRESSURE: 29 MMHG
VIT B12 SERPL-MCNC: 688 PG/ML (ref 210–950)
WBC # BLD AUTO: 13.39 K/UL (ref 3.9–12.7)

## 2020-02-01 PROCEDURE — 84100 ASSAY OF PHOSPHORUS: CPT

## 2020-02-01 PROCEDURE — 25000003 PHARM REV CODE 250: Performed by: INTERNAL MEDICINE

## 2020-02-01 PROCEDURE — 99223 PR INITIAL HOSPITAL CARE,LEVL III: ICD-10-PCS | Mod: ,,, | Performed by: INTERNAL MEDICINE

## 2020-02-01 PROCEDURE — 21400001 HC TELEMETRY ROOM

## 2020-02-01 PROCEDURE — 25000003 PHARM REV CODE 250: Performed by: HOSPITALIST

## 2020-02-01 PROCEDURE — 63600175 PHARM REV CODE 636 W HCPCS: Performed by: HOSPITALIST

## 2020-02-01 PROCEDURE — 86592 SYPHILIS TEST NON-TREP QUAL: CPT

## 2020-02-01 PROCEDURE — 83735 ASSAY OF MAGNESIUM: CPT | Mod: 91

## 2020-02-01 PROCEDURE — 80053 COMPREHEN METABOLIC PANEL: CPT

## 2020-02-01 PROCEDURE — 36415 COLL VENOUS BLD VENIPUNCTURE: CPT

## 2020-02-01 PROCEDURE — 84134 ASSAY OF PREALBUMIN: CPT

## 2020-02-01 PROCEDURE — A9585 GADOBUTROL INJECTION: HCPCS | Performed by: HOSPITALIST

## 2020-02-01 PROCEDURE — 25500020 PHARM REV CODE 255: Performed by: HOSPITALIST

## 2020-02-01 PROCEDURE — S0030 INJECTION, METRONIDAZOLE: HCPCS | Performed by: HOSPITALIST

## 2020-02-01 PROCEDURE — 82746 ASSAY OF FOLIC ACID SERUM: CPT

## 2020-02-01 PROCEDURE — 84145 PROCALCITONIN (PCT): CPT

## 2020-02-01 PROCEDURE — 82272 OCCULT BLD FECES 1-3 TESTS: CPT

## 2020-02-01 PROCEDURE — 36410 VNPNXR 3YR/> PHY/QHP DX/THER: CPT

## 2020-02-01 PROCEDURE — 82607 VITAMIN B-12: CPT

## 2020-02-01 PROCEDURE — 85025 COMPLETE CBC W/AUTO DIFF WBC: CPT

## 2020-02-01 PROCEDURE — 99223 1ST HOSP IP/OBS HIGH 75: CPT | Mod: ,,, | Performed by: INTERNAL MEDICINE

## 2020-02-01 PROCEDURE — 84484 ASSAY OF TROPONIN QUANT: CPT | Mod: 91

## 2020-02-01 RX ORDER — METRONIDAZOLE 250 MG/1
500 TABLET ORAL EVERY 8 HOURS
Status: DISCONTINUED | OUTPATIENT
Start: 2020-02-01 | End: 2020-02-03 | Stop reason: HOSPADM

## 2020-02-01 RX ORDER — CIPROFLOXACIN 2 MG/ML
400 INJECTION, SOLUTION INTRAVENOUS
Status: DISCONTINUED | OUTPATIENT
Start: 2020-02-01 | End: 2020-02-01

## 2020-02-01 RX ORDER — LISINOPRIL 5 MG/1
10 TABLET ORAL DAILY
Status: DISCONTINUED | OUTPATIENT
Start: 2020-02-01 | End: 2020-02-03 | Stop reason: HOSPADM

## 2020-02-01 RX ORDER — METRONIDAZOLE 500 MG/100ML
500 INJECTION, SOLUTION INTRAVENOUS
Status: DISCONTINUED | OUTPATIENT
Start: 2020-02-01 | End: 2020-02-01

## 2020-02-01 RX ORDER — CLOPIDOGREL BISULFATE 75 MG/1
75 TABLET ORAL DAILY
Status: DISCONTINUED | OUTPATIENT
Start: 2020-02-01 | End: 2020-02-03 | Stop reason: HOSPADM

## 2020-02-01 RX ORDER — GADOBUTROL 604.72 MG/ML
5 INJECTION INTRAVENOUS
Status: COMPLETED | OUTPATIENT
Start: 2020-02-01 | End: 2020-02-01

## 2020-02-01 RX ORDER — MAGNESIUM SULFATE 1 G/100ML
1 INJECTION INTRAVENOUS
Status: DISPENSED | OUTPATIENT
Start: 2020-02-01 | End: 2020-02-01

## 2020-02-01 RX ADMIN — CIPROFLOXACIN 400 MG: 2 INJECTION, SOLUTION INTRAVENOUS at 03:02

## 2020-02-01 RX ADMIN — METRONIDAZOLE 500 MG: 250 TABLET ORAL at 04:02

## 2020-02-01 RX ADMIN — BUPROPION HYDROCHLORIDE 150 MG: 150 TABLET, FILM COATED, EXTENDED RELEASE ORAL at 11:02

## 2020-02-01 RX ADMIN — ASPIRIN 81 MG: 81 TABLET, COATED ORAL at 11:02

## 2020-02-01 RX ADMIN — METRONIDAZOLE 500 MG: 500 INJECTION, SOLUTION INTRAVENOUS at 05:02

## 2020-02-01 RX ADMIN — SIMVASTATIN 20 MG: 10 TABLET, FILM COATED ORAL at 08:02

## 2020-02-01 RX ADMIN — OXYBUTYNIN CHLORIDE 5 MG: 5 TABLET ORAL at 08:02

## 2020-02-01 RX ADMIN — METOPROLOL SUCCINATE 50 MG: 50 TABLET, FILM COATED, EXTENDED RELEASE ORAL at 11:02

## 2020-02-01 RX ADMIN — INSULIN ASPART 6 UNITS: 100 INJECTION, SOLUTION INTRAVENOUS; SUBCUTANEOUS at 04:02

## 2020-02-01 RX ADMIN — MAGNESIUM SULFATE 1 G: 1 INJECTION INTRAVENOUS at 06:02

## 2020-02-01 RX ADMIN — OXYBUTYNIN CHLORIDE 5 MG: 5 TABLET ORAL at 11:02

## 2020-02-01 RX ADMIN — GADOBUTROL 5 ML: 604.72 INJECTION INTRAVENOUS at 09:02

## 2020-02-01 RX ADMIN — LISINOPRIL 10 MG: 5 TABLET ORAL at 11:02

## 2020-02-01 RX ADMIN — CLOPIDOGREL BISULFATE 75 MG: 75 TABLET ORAL at 11:02

## 2020-02-01 RX ADMIN — METRONIDAZOLE 500 MG: 250 TABLET ORAL at 08:02

## 2020-02-01 RX ADMIN — METRONIDAZOLE 500 MG: 500 INJECTION, SOLUTION INTRAVENOUS at 11:02

## 2020-02-01 RX ADMIN — MAGNESIUM SULFATE 1 G: 1 INJECTION INTRAVENOUS at 04:02

## 2020-02-01 RX ADMIN — INSULIN ASPART 4 UNITS: 100 INJECTION, SOLUTION INTRAVENOUS; SUBCUTANEOUS at 08:02

## 2020-02-01 NOTE — SUBJECTIVE & OBJECTIVE
Interval History: pt resting, easily awakens, denies CP    Review of Systems   Unable to perform ROS: Dementia   Endocrine: Positive for cold intolerance.     Objective:     Vital Signs (Most Recent):  Temp: 97.6 °F (36.4 °C) (02/01/20 0746)  Pulse: 92 (02/01/20 0746)  Resp: 17 (02/01/20 0746)  BP: (!) 126/58 (02/01/20 0746)  SpO2: 100 % (02/01/20 0746) Vital Signs (24h Range):  Temp:  [97.6 °F (36.4 °C)-98.9 °F (37.2 °C)] 97.6 °F (36.4 °C)  Pulse:  [] 92  Resp:  [17-18] 17  SpO2:  [97 %-100 %] 100 %  BP: (126-150)/(29-84) 126/58     Weight: 56.1 kg (123 lb 10.9 oz)  Body mass index is 24.15 kg/m².    Intake/Output Summary (Last 24 hours) at 2/1/2020 1155  Last data filed at 2/1/2020 0521  Gross per 24 hour   Intake 300 ml   Output --   Net 300 ml      Physical Exam   Constitutional: She appears well-developed and well-nourished. No distress.   HENT:   Head: Normocephalic and atraumatic.   Mouth/Throat: Oropharynx is clear and moist.   Eyes: Pupils are equal, round, and reactive to light. EOM are normal.   Neck: Normal range of motion. Neck supple. No JVD present.   Cardiovascular: Normal rate, regular rhythm and intact distal pulses.   Pulmonary/Chest: Effort normal and breath sounds normal. No respiratory distress.   Abdominal: Soft. Bowel sounds are normal. She exhibits no distension.   Musculoskeletal: She exhibits tenderness. She exhibits no edema.   Neurological: She is alert.   Oriented to name    Skin: Skin is warm and dry. Capillary refill takes less than 2 seconds.   Psychiatric:   Pleasantly confused, cooperative        Significant Labs:   A1C: No results for input(s): HGBA1C in the last 4320 hours.  Blood Culture: No results for input(s): LABBLOO in the last 48 hours.  BMP:   Recent Labs   Lab 02/01/20  0125   *      K 3.5      CO2 24   BUN 7*   CREATININE 0.8   CALCIUM 8.5*   MG 0.9*     CBC:   Recent Labs   Lab 01/31/20 2001 02/01/20  0125   WBC 13.10* 13.39*   HGB 11.7* 11.8*    HCT 34.5* 35.0*    191     Cardiac Markers:   Recent Labs   Lab 01/31/20 2001   *     Coagulation: No results for input(s): PT, INR, APTT in the last 48 hours.  Lipid Panel:   Recent Labs   Lab 01/31/20 2001   CHOL 77*   HDL 32*   LDLCALC 17.2*   TRIG 139   CHOLHDL 41.6     Magnesium:   Recent Labs   Lab 02/01/20  0125   MG 0.9*     POCT Glucose:   Recent Labs   Lab 01/31/20 2035 02/01/20  0747   POCTGLUCOSE 156* 183*     Troponin:   Recent Labs   Lab 01/31/20 2001 02/01/20 0125 02/01/20  0810   TROPONINI 0.134*  0.134* 0.118* 0.110*     TSH:   Recent Labs   Lab 01/31/20 2001   TSH 0.912     Urine Studies: No results for input(s): COLORU, APPEARANCEUA, PHUR, SPECGRAV, PROTEINUA, GLUCUA, KETONESU, BILIRUBINUA, OCCULTUA, NITRITE, UROBILINOGEN, LEUKOCYTESUR, RBCUA, WBCUA, BACTERIA, SQUAMEPITHEL, HYALINECASTS in the last 48 hours.    Invalid input(s): MARIA LUZ    Significant Imaging: I have reviewed and interpreted all pertinent imaging results/findings within the past 24 hours.

## 2020-02-01 NOTE — H&P
[facility] Hospital Medicine  History & Physical    Patient Name: Sandy Uriarte  MRN: [id number]  Admission Date: 1/31/2020  Attending Physician: Lety Rosario MD   Primary Care Provider: Rosendo Lawrence MD         Patient information was obtained from relative(s) and ER records.     Subjective:     Principal Problem:NSTEMI (non-ST elevated myocardial infarction)    Chief Complaint:   Chief Complaint   Patient presents with    Chest Pain     Transfer from outside facility for evaluation of NSTEMI and acute colitis        HPI:   Sandy Uriarte is a 85 y.o. female that (in part)  has a past medical history of Cancer, Diabetes mellitus, Hypertension, Neuropathy, Other and unspecified hyperlipidemia, and Thyroid disease.  has a past surgical history that includes Tonsillectomy; Knee surgery; Back surgery; Cholecystectomy; Mastectomy; and Hysterectomy. Presents to [facility] as a transfer patient from [facility].    Reason for transfer request per Dr. Patel, transfer center physician :     Ms. Uriarte is an 86yo lady with a past medical history of DM2, CVA and hypothyroidism.  She presented to [facility] on 1/27 complaining of nausea, vomiting and anorexia for several days prior to admission.  Accompanying this was some generalized abdominal pain.  This led to a CT abdomen which revealed thickening of the transverse, descending, and sigmoid colon consistent with colitis.  She had a WBC count of 15 at admission.  She was admitted for infectious colitis and UTI and was placed on Cipro and Flagyl IV.  Rocephin 1g iv q24 was added on 1/28 for possible UTI (urine sample wasnt good, but she had K.pneumo recently on an old cx sensitive to this. Cipro was stopped today due to confusion.     Her other complaint was some dysphagia. She underwent an esophagram on 1/29 which revealed dysmotility without evidence for esophageal obstruction.  She is now combative  and confused as well.  She had a CT head with no focal, acute changes.     The sending MD is also concerned due to developing on Tuesday night with some burning in her chest, but had a normal EKG.  While she was getting her esophagram, she developed some flip Ts anterior on the telemetry, so a repeat EKG, though she had no symptoms.  Her troponin was minimally elevated to max of 0.72 and came down to 0.51 on the following exams.  She was started on ASA and therapeutic Lovenox.  She was also begun on beta blockers.     It does take 3-5 days to get the results of an echo, so this was not done.    Additionally the family reports she was having TIA like symptoms with recurrent episodes of left-sided facial weakness and inability to close her eye.  Spontaneous resolution x2 episodes.  Family also reports progressive weakness and decreased mobility since December.    Hospital medicine has been asked to admit to inpatient for further evaluation and treatment.       Interval History: pt resting, easily awakens, denies CP    Review of Systems   Unable to perform ROS: Dementia   Endocrine: Positive for cold intolerance.     Objective:     Vital Signs (Most Recent):  Temp: 97.6 °F (36.4 °C) (02/01/20 0746)  Pulse: 92 (02/01/20 0746)  Resp: 17 (02/01/20 0746)  BP: (!) 126/58 (02/01/20 0746)  SpO2: 100 % (02/01/20 0746) Vital Signs (24h Range):  Temp:  [97.6 °F (36.4 °C)-98.9 °F (37.2 °C)] 97.6 °F (36.4 °C)  Pulse:  [] 92  Resp:  [17-18] 17  SpO2:  [97 %-100 %] 100 %  BP: (126-150)/(29-84) 126/58     Weight: 56.1 kg (123 lb 10.9 oz)  Body mass index is 24.15 kg/m².    Intake/Output Summary (Last 24 hours) at 2/1/2020 1158  Last data filed at 2/1/2020 0585  Gross per 24 hour   Intake 300 ml   Output --   Net 300 ml      Physical Exam   Constitutional: She appears well-developed and well-nourished. No distress.   HENT:   Head: Normocephalic and atraumatic.   Mouth/Throat: Oropharynx is clear and moist.   Eyes: Pupils are  equal, round, and reactive to light. EOM are normal.   Neck: Normal range of motion. Neck supple. No JVD present.   Cardiovascular: Normal rate, regular rhythm and intact distal pulses.   Pulmonary/Chest: Effort normal and breath sounds normal. No respiratory distress.   Abdominal: Soft. Bowel sounds are normal. She exhibits no distension.   Musculoskeletal: She exhibits tenderness. She exhibits no edema.   Neurological: She is alert.   Oriented to name    Skin: Skin is warm and dry. Capillary refill takes less than 2 seconds.   Psychiatric:   Pleasantly confused, cooperative        Significant Labs:   A1C: No results for input(s): HGBA1C in the last 4320 hours.  Blood Culture: No results for input(s): LABBLOO in the last 48 hours.  BMP:   Recent Labs   Lab 02/01/20  0125   *      K 3.5      CO2 24   BUN 7*   CREATININE 0.8   CALCIUM 8.5*   MG 0.9*     CBC:   Recent Labs   Lab 01/31/20 2001 02/01/20  0125   WBC 13.10* 13.39*   HGB 11.7* 11.8*   HCT 34.5* 35.0*    191     Cardiac Markers:   Recent Labs   Lab 01/31/20 2001   *     Coagulation: No results for input(s): PT, INR, APTT in the last 48 hours.  Lipid Panel:   Recent Labs   Lab 01/31/20 2001   CHOL 77*   HDL 32*   LDLCALC 17.2*   TRIG 139   CHOLHDL 41.6     Magnesium:   Recent Labs   Lab 02/01/20  0125   MG 0.9*     POCT Glucose:   Recent Labs   Lab 01/31/20 2035 02/01/20  0747   POCTGLUCOSE 156* 183*     Troponin:   Recent Labs   Lab 01/31/20 2001 02/01/20  0125 02/01/20  0810   TROPONINI 0.134*  0.134* 0.118* 0.110*     TSH:   Recent Labs   Lab 01/31/20 2001   TSH 0.912     Urine Studies: No results for input(s): COLORU, APPEARANCEUA, PHUR, SPECGRAV, PROTEINUA, GLUCUA, KETONESU, BILIRUBINUA, OCCULTUA, NITRITE, UROBILINOGEN, LEUKOCYTESUR, RBCUA, WBCUA, BACTERIA, SQUAMEPITHEL, HYALINECASTS in the last 48 hours.    Invalid input(s): WRIGHTSUR    Significant Imaging: I have reviewed and interpreted all pertinent imaging  results/findings within the past 24 hours.    Assessment/Plan:     * NSTEMI (non-ST elevated myocardial infarction)  Medical management   Aspirin, stain and plavix  ECHo pending  No ischemic intervention planned at this time  Cardiac diet  Code status will be discussed further        Hypomagnesemia  MgSO4 IV   Repeat Mg      Abnormal CT of the abdomen  Colitis - advance diet as tolerated  IV abx     Esophageal dysmotility  Mechanical soft diet  Reassess by Speech therapy when available   PPI      Essential hypertension  Controlled  Resume lisinopril and metoprolol        Diabetes mellitus, type 2    No documented A1c  On   Oral meds at home - hold while here  SSI   Diabetic diet       Acute colitis  Per CT  IV abx  Advance diet as tolerated  Supportive care         VTE Risk Mitigation (From admission, onward)         Ordered     Place sequential compression device  Until discontinued      01/31/20 1926     IP VTE HIGH RISK PATIENT  Once      01/31/20 1926                   Lety Rosario MD  Department of Hospital Medicine   [facility]

## 2020-02-01 NOTE — PROGRESS NOTES
Unable to complete med rec. Family did not have patient's list of home meds with them. Stated they will bring the list later today. Home levothyroxine not yet ordered. Patient rested comfortably all shift. No significant events. Will give report to GENIE Tejada>

## 2020-02-01 NOTE — PROGRESS NOTES
Medical records received from [facility]; placed in patient's blue folder.

## 2020-02-01 NOTE — H&P
[facility] Medicine  History & Physical    Patient Name: Sandy Uriarte  MRN: [id number]  Admission Date: 02/01/2020  Attending Physician: Rupert John MD, MPH      PCP:     Rosendo Lawrence MD    CC:     Chief Complaint   Patient presents with    Chest Pain     Transfer from outside facility for evaluation of NSTEMI and acute colitis       HISTORY OF PRESENT ILLNESS:     Sandy Uriarte is a 85 y.o. female that (in part)  has a past medical history of Cancer, Diabetes mellitus, Hypertension, Neuropathy, Other and unspecified hyperlipidemia, and Thyroid disease.  has a past surgical history that includes Tonsillectomy; Knee surgery; Back surgery; Cholecystectomy; Mastectomy; and Hysterectomy. Presents to [facility] as a transfer patient from [facility].    Reason for transfer request per Dr. Patel, transfer center physician :     Ms. Uriarte is an 86yo lady with a past medical history of DM2, CVA and hypothyroidism.  She presented to [facility] on 1/27 complaining of nausea, vomiting and anorexia for several days prior to admission.  Accompanying this was some generalized abdominal pain.  This led to a CT abdomen which revealed thickening of the transverse, descending, and sigmoid colon consistent with colitis.  She had a WBC count of 15 at admission.  She was admitted for infectious colitis and UTI and was placed on Cipro and Flagyl IV.  Rocephin 1g iv q24 was added on 1/28 for possible UTI (urine sample wasnt good, but she had K.pneumo recently on an old cx sensitive to this. Cipro was stopped today due to confusion.     Her other complaint was some dysphagia. She underwent an esophagram on 1/29 which revealed dysmotility without evidence for esophageal obstruction.  She is now combative and confused as well.  She had a CT head with no focal, acute changes.     The sending MD is also concerned due to developing on Tuesday night with  some burning in her chest, but had a normal EKG.  While she was getting her esophagram, she developed some flip Ts anterior on the telemetry, so a repeat EKG, though she had no symptoms.  Her troponin was minimally elevated to max of 0.72 and came down to 0.51 on the following exams.  She was started on ASA and therapeutic Lovenox.  She was also begun on beta blockers.     It does take 3-5 days to get the results of an echo, so this was not done.    Additionally the family reports she was having TIA like symptoms with recurrent episodes of left-sided facial weakness and inability to close her eye.  Spontaneous resolution x2 episodes.  Family also reports progressive weakness and decreased mobility since December.    Hospital medicine has been asked to admit to inpatient for further evaluation and treatment.       REVIEW OF SYSTEMS:     -- Constitutional:  Generalized weakness and fatigue.  Confusion.  No fever or chills.  -- Eyes: No visual changes, diplopia, pain, tearing, blind spots, or discharge.   -- Ears, nose, mouth, throat, and face: No congestion, sore throat, epistaxis, d/c, bleeding gums, neck stiffness masses, or dental issues.  -- Respiratory: No cough, shortness of breath, hemoptysis, stridor, wheezing, or night sweats.  -- Cardiovascular:  Chest pain outside facility prior to transfer as noted above in the HPI.   -- Gastrointestinal:  Abdominal pain as noted above in the HPI.  -- Genitourinary: No hematuria, dysuria, frequency, urgency, nocturia, polyuria, stones, or incontinence.  -- Integument/breast: No rash, pruritis, pigmentation changes, dryness, or changes in hair  -- Hematologic/lymphatic: No easy bruising or lymphadenopathy.   -- Musculoskeletal:  Subacute muscle weakness.  Decreased ambulation due to lower extremity weakness  -- Neurological:  Ataxia.  Frequent falls.  Transient focal neurologic deficits with facial droop.  No seizures, headaches, paraesthesias  -- Behavioral/Psych: + sun  downing + increased confusion.    -- Endocrine: No heat or cold intolerance, polydipsia, or unintentional weight gain / loss.  -- Allergy/Immunologic: No recurrent infections or adverse reaction to food, insects, or difficulty breathing.      PAST MEDICAL / SURGICAL HISTORY:     Past Medical History:   Diagnosis Date    Cancer     breast cancer    Diabetes mellitus     Hypertension     Neuropathy     Other and unspecified hyperlipidemia     Thyroid disease      Past Surgical History:   Procedure Laterality Date    BACK SURGERY      CHOLECYSTECTOMY      HYSTERECTOMY      KNEE SURGERY      MASTECTOMY      right    TONSILLECTOMY           FAMILY HISTORY:     Family history of cardiovascular disease      SOCIAL HISTORY:     Social History     Socioeconomic History    Marital status:      Spouse name: Not on file    Number of children: Not on file    Years of education: Not on file    Highest education level: Not on file   Occupational History    Not on file   Social Needs    Financial resource strain: Not on file    Food insecurity:     Worry: Not on file     Inability: Not on file    Transportation needs:     Medical: Not on file     Non-medical: Not on file   Tobacco Use    Smoking status: Never Smoker    Smokeless tobacco: Never Used   Substance and Sexual Activity    Alcohol use: Yes    Drug use: Not on file    Sexual activity: Not on file   Lifestyle    Physical activity:     Days per week: Not on file     Minutes per session: Not on file    Stress: Not on file   Relationships    Social connections:     Talks on phone: Not on file     Gets together: Not on file     Attends Caodaism service: Not on file     Active member of club or organization: Not on file     Attends meetings of clubs or organizations: Not on file     Relationship status: Not on file   Other Topics Concern    Not on file   Social History Narrative    Not on file         ALLERGIES:       Review of patient's  allergies indicates:  No Known Allergies      HEALTH SCREENING:     Influenza vaccine not up-to-date for this season.  Prevnar 13 pneumonia vaccine = no evidence of previous vaccination found in the medical record      HOME MEDICATIONS:     Prior to Admission medications    Medication Sig Start Date End Date Taking? Authorizing Provider   buPROPion (WELLBUTRIN XL) 150 MG TB24 tablet 1 daily for mood 3/7/17   Historical Provider, MD   cyanocobalamin 1,000 mcg/mL injection Inject 1 mL every 2 weeks by intramuscular route as directed. 1/2/17   Historical Provider, MD   diphenoxylate-atropine 2.5-0.025 mg (LOMOTIL) 2.5-0.025 mg per tablet TAKE ONE TABLET BY MOUTH FOUR TIMES DAILY AS NEEDED LOOSE BOWELS 3/28/17   Historical Provider, MD   glimepiride (AMARYL) 2 MG tablet Take 1 tablet(s) every day by oral route. 3/31/17   Historical Provider, MD   metformin (GLUCOPHAGE) 500 MG tablet take one-half tablet by mouth 4 times daily for diabetes 3/31/17   Historical Provider, MD   metoprolol succinate (TOPROL-XL) 50 MG 24 hr tablet take one and one-half tablet by mouth daily for high blood pressure 3/31/17   Historical Provider, MD   oxybutynin (DITROPAN) 5 MG Tab Take 5 mg by mouth 2 (two) times daily. 3/31/17   Historical Provider, MD   simvastatin (ZOCOR) 20 MG tablet one tablet by mouth daily 3/31/17   Historical Provider, MD   sulfamethoxazole-trimethoprim 800-160mg (BACTRIM DS) 800-160 mg Tab Take 0.5 tablets by mouth once daily.  3/31/17   Historical Provider, MD          HOSPITAL MEDICATIONS:     Scheduled Meds:    aspirin  81 mg Oral Daily    buPROPion  150 mg Oral Daily    ciprofloxacin  400 mg Intravenous Q12H    lisinopril  10 mg Oral Daily    magnesium sulfate IVPB  1 g Intravenous Q2H    metoprolol succinate  50 mg Oral Daily    metronidazole  500 mg Intravenous Q8H    oxybutynin  5 mg Oral BID    polyethylene glycol  17 g Oral Daily    simvastatin  20 mg Oral QHS     Continuous Infusions:   PRN Meds:  acetaminophen, dextrose 50%, dextrose 50%, glucagon (human recombinant), glucose, glucose, influenza, insulin aspart U-100, morphine, nitroGLYCERIN, ondansetron, oxyCODONE-acetaminophen, oxyCODONE-acetaminophen, pneumoc 13-tristan conj-dip cr(PF), sodium chloride 0.9%      PHYSICAL EXAM:     Wt Readings from Last 1 Encounters:   01/31/20 2100 56.1 kg (123 lb 11.2 oz)     Body mass index is 24.16 kg/m².  Vitals:    01/31/20 2038 01/31/20 2100 01/31/20 2235 02/01/20 0021   BP: (!) 150/60   (!) 137/56   BP Location:    Left leg   Patient Position: Lying   Lying   Pulse: 93   98   Resp: 17   18   Temp: 98.3 °F (36.8 °C)   98.1 °F (36.7 °C)   TempSrc: Oral   Axillary   SpO2: 100%  98% 97%   Weight:  56.1 kg (123 lb 11.2 oz)     Height:  5' (1.524 m)            -- General appearance:  Elderly female who is lying in bed.  No apparent distress.  well developed. appears stated age   -- Head: normocephalic, atraumatic   -- Eyes: conjunctivae clear. Extraocular muscles intact  -- Nose: Nares normal. Septum midline.   -- Mouth/Throat: lips, mucosa, and tongue normal. no throat erythema.   -- Neck: supple, symmetrical, trachea midline, no JVD and thyroid not grossly enlarged, appears symmetric  -- Lungs: clear to auscultation bilaterally. normal respiratory effort. No use of accessory muscles.   -- Chest wall: no tenderness. equal bilateral chest rise   -- Heart: regular rate and regular rhythm. S1, S2 normal.  no click, rub or gallop   -- Abdomen: soft, non-tender, non-distended, non-tympanic; bowel sounds normal; no masses  -- Extremities: 4/5 bilateral lower extremity strength.  no cyanosis, clubbing or edema.   -- Pulses: 2+ and symmetric   -- Skin:  Turgor normal. Color normal. Texture normal. No rashes or lesions.   -- Neurologic:  Globally decreased muscle strength and tone. No focal numbness or weakness. CNII-XII intact. Petrona coma scale: eyes open spontaneously-4, oriented & converses-5, obeys commands-6.      LABORATORY  STUDIES:     Labs from outside facility on 1/31: , K 3.7, , GLUC 148, CO2 22, BUN 10, CR 0.79, CA 8, TP 5.7, ALB 3.1, AP 49, ALT 8, AST 12, TB 0.2, WBC 12.4, HG 12.2, HCT 37, , TROP 0.72à0.51, CK 45, MB 3.8, LIPASE 10    Recent Results (from the past 36 hour(s))   Brain natriuretic peptide - if not done in ER    Collection Time: 01/31/20  8:01 PM   Result Value Ref Range     (H) 0 - 99 pg/mL   Troponin I    Collection Time: 01/31/20  8:01 PM   Result Value Ref Range    Troponin I 0.134 (H) 0.000 - 0.026 ng/mL   Troponin I    Collection Time: 01/31/20  8:01 PM   Result Value Ref Range    Troponin I 0.134 (H) 0.000 - 0.026 ng/mL   CBC auto differential    Collection Time: 01/31/20  8:01 PM   Result Value Ref Range    WBC 13.10 (H) 3.90 - 12.70 K/uL    RBC 4.06 4.00 - 5.40 M/uL    Hemoglobin 11.7 (L) 12.0 - 16.0 g/dL    Hematocrit 34.5 (L) 37.0 - 48.5 %    Mean Corpuscular Volume 85 82 - 98 fL    Mean Corpuscular Hemoglobin 28.8 27.0 - 31.0 pg    Mean Corpuscular Hemoglobin Conc 33.9 32.0 - 36.0 g/dL    RDW 12.8 11.5 - 14.5 %    Platelets 195 150 - 350 K/uL    MPV 12.6 9.2 - 12.9 fL    Immature Granulocytes 1.2 (H) 0.0 - 0.5 %    Gran # (ANC) 8.6 (H) 1.8 - 7.7 K/uL    Immature Grans (Abs) 0.16 (H) 0.00 - 0.04 K/uL    Lymph # 3.2 1.0 - 4.8 K/uL    Mono # 1.0 0.3 - 1.0 K/uL    Eos # 0.1 0.0 - 0.5 K/uL    Baso # 0.05 0.00 - 0.20 K/uL    nRBC 0 0 /100 WBC    Gran% 65.8 38.0 - 73.0 %    Lymph% 24.7 18.0 - 48.0 %    Mono% 7.3 4.0 - 15.0 %    Eosinophil% 0.6 0.0 - 8.0 %    Basophil% 0.4 0.0 - 1.9 %    Differential Method Automated    Basic metabolic panel    Collection Time: 01/31/20  8:01 PM   Result Value Ref Range    Sodium 136 136 - 145 mmol/L    Potassium 3.7 3.5 - 5.1 mmol/L    Chloride 102 95 - 110 mmol/L    CO2 21 (L) 23 - 29 mmol/L    Glucose 136 (H) 70 - 110 mg/dL    BUN, Bld 8 8 - 23 mg/dL    Creatinine 0.8 0.5 - 1.4 mg/dL    Calcium 8.4 (L) 8.7 - 10.5 mg/dL    Anion Gap 13 8 - 16 mmol/L     eGFR if African American >60 >60 mL/min/1.73 m^2    eGFR if non African American >60 >60 mL/min/1.73 m^2   Lipid panel - if not done in ED    Collection Time: 01/31/20  8:01 PM   Result Value Ref Range    Cholesterol 77 (L) 120 - 199 mg/dL    Triglycerides 139 30 - 150 mg/dL    HDL 32 (L) 40 - 75 mg/dL    LDL Cholesterol 17.2 (L) 63.0 - 159.0 mg/dL    Hdl/Cholesterol Ratio 41.6 20.0 - 50.0 %    Total Cholesterol/HDL Ratio 2.4 2.0 - 5.0    Non-HDL Cholesterol 45 mg/dL   Sedimentation rate    Collection Time: 01/31/20  8:01 PM   Result Value Ref Range    Sed Rate 24 (H) 0 - 20 mm/Hr   TSH    Collection Time: 01/31/20  8:01 PM   Result Value Ref Range    TSH 0.912 0.400 - 4.000 uIU/mL   T4, free    Collection Time: 01/31/20  8:01 PM   Result Value Ref Range    Free T4 1.22 0.71 - 1.51 ng/dL   Type and Screen    Collection Time: 01/31/20  8:02 PM   Result Value Ref Range    Group & Rh O POS     Indirect Jessica NEG    POCT glucose    Collection Time: 01/31/20  8:35 PM   Result Value Ref Range    POCT Glucose 156 (H) 70 - 110 mg/dL   Troponin I    Collection Time: 02/01/20  1:25 AM   Result Value Ref Range    Troponin I 0.118 (H) 0.000 - 0.026 ng/mL   Comprehensive metabolic panel    Collection Time: 02/01/20  1:25 AM   Result Value Ref Range    Sodium 137 136 - 145 mmol/L    Potassium 3.5 3.5 - 5.1 mmol/L    Chloride 102 95 - 110 mmol/L    CO2 24 23 - 29 mmol/L    Glucose 164 (H) 70 - 110 mg/dL    BUN, Bld 7 (L) 8 - 23 mg/dL    Creatinine 0.8 0.5 - 1.4 mg/dL    Calcium 8.5 (L) 8.7 - 10.5 mg/dL    Total Protein 5.8 (L) 6.0 - 8.4 g/dL    Albumin 2.8 (L) 3.5 - 5.2 g/dL    Total Bilirubin 0.2 0.1 - 1.0 mg/dL    Alkaline Phosphatase 50 (L) 55 - 135 U/L    AST 20 10 - 40 U/L    ALT 14 10 - 44 U/L    Anion Gap 11 8 - 16 mmol/L    eGFR if African American >60 >60 mL/min/1.73 m^2    eGFR if non African American >60 >60 mL/min/1.73 m^2   CBC auto differential    Collection Time: 02/01/20  1:25 AM   Result Value Ref Range    WBC  13.39 (H) 3.90 - 12.70 K/uL    RBC 4.15 4.00 - 5.40 M/uL    Hemoglobin 11.8 (L) 12.0 - 16.0 g/dL    Hematocrit 35.0 (L) 37.0 - 48.5 %    Mean Corpuscular Volume 84 82 - 98 fL    Mean Corpuscular Hemoglobin 28.4 27.0 - 31.0 pg    Mean Corpuscular Hemoglobin Conc 33.7 32.0 - 36.0 g/dL    RDW 12.7 11.5 - 14.5 %    Platelets 191 150 - 350 K/uL    MPV 11.9 9.2 - 12.9 fL    Immature Granulocytes 1.0 (H) 0.0 - 0.5 %    Gran # (ANC) 9.2 (H) 1.8 - 7.7 K/uL    Immature Grans (Abs) 0.14 (H) 0.00 - 0.04 K/uL    Lymph # 2.9 1.0 - 4.8 K/uL    Mono # 1.0 0.3 - 1.0 K/uL    Eos # 0.1 0.0 - 0.5 K/uL    Baso # 0.05 0.00 - 0.20 K/uL    nRBC 0 0 /100 WBC    Gran% 68.7 38.0 - 73.0 %    Lymph% 21.4 18.0 - 48.0 %    Mono% 7.8 4.0 - 15.0 %    Eosinophil% 0.7 0.0 - 8.0 %    Basophil% 0.4 0.0 - 1.9 %    Differential Method Automated    Phosphorus    Collection Time: 02/01/20  1:25 AM   Result Value Ref Range    Phosphorus 2.3 (L) 2.7 - 4.5 mg/dL   Magnesium    Collection Time: 02/01/20  1:25 AM   Result Value Ref Range    Magnesium 0.9 (LL) 1.6 - 2.6 mg/dL       No results found for: INR, PROTIME  No results found for: HGBA1C  Recent Labs     01/31/20 2035   POCTGLUCOSE 156*           IMAGING:       CT head from outside facility: moderate to severe diffuse cerebral and cerebellar atrophy with dilated ventricles.  Periventricular small vessel ischemic change.  Intracranial arterial atherosclerosis.      ==========================================================     MRI from January 2018 1/5/2018       Narrative     MRI examination of the brain with and without IV contrast dated 01/05/2018.    Prior study of 04/18/2017.    Multiplanar and multi-sequence MRI examination of the brain was performed both prior to and following the administration of intravenous contrast material.    There is no MRI evidence of an acute infarct, hemorrhage, enhancing mass or midline shift.  No abnormal signal alteration seen on diffusion weighted images.       There is atrophy and small vessel ischemic changes of the periventricular white matter.  Again noted is more focal, stable atrophy along the superior aspect of the left frontal parietal region.    No visualized significant paranasal sinus disease.      Impression       1.  No MRI evidence of an acute intracranial abnormality.  2.  Atrophy and small vessel ischemic changes of the periventricular white matter.  Probable old cortical infarct along the vertex of the left frontal parietal region.  3.  No interval change when compared with the prior study.           CONSULTS:     IP CONSULT TO CARDIOLOGY  IP CONSULT TO SOCIAL WORK/CASE MANAGEMENT  IP CONSULT TO NEUROLOGY       ASSESSMENT & PLAN:     Primary Diagnosis:  NSTEMI (non-ST elevated myocardial infarction)    Active Hospital Problems    Diagnosis  POA    *NSTEMI (non-ST elevated myocardial infarction) [I21.4]  Yes     Priority: 1 - High    Acute colitis [K52.9]  Yes     Priority: 2     TIA (transient ischemic attack) [G45.9]  Yes     Priority: 3     Diabetes mellitus, type 2 [E11.9]  Yes     Priority: 3     Hypomagnesemia [E83.42]  Yes     Priority: 4     Essential hypertension [I10]  Yes    Thyroid dysfunction [E07.9]  Yes    Esophageal dysmotility [K22.4]  Yes    Abnormal CT of the abdomen [R93.5]  Yes      Resolved Hospital Problems   No resolved problems to display.       NSTEMI  · EKG during endoscopy procedure outside facility showed transient inverted T-waves   · Initial troponin minimally elevated to a maximum 0.7 to came down to 0.51  · Given aspirin full-dose Lovenox small sternal beta-blockers prior to transfer.  · Intermediate risk for MI;  Age, diabetes, hypertension  https://www.mdcalc.com/heart-score-major-cardiac-events  http://clincalc.com/cardiology/ascvd/pooledcohort.aspx  · Aspirin  · Beta-blocker   · Statin  · Supplemental oxygen  · nitroglycerine and morphine PRN  · 2D echocardiogram  · TSH, FT3, FT4  · ESR and cardiac specific  CRP   · PT, PTT, INR   · Tight glycemic control  · Monitor on telemetry unit  · Consult cardiologist for further evaluation and recommendations    Acute colitis  · As evidence by history, physical exam, and CT imaging  · Initiated on ciprofloxacin and metronidazole at outside facility on January 27.  Ciprofloxacin discontinued secondary to confusion.  Was then started on ceftriaxone on January 28 possible UTI.  · Abdominal pain and tenderness has decreased.  Patient underwent esophagram 2 days ago on January 29th which revealed dysmotility without evidence of esophageal obstruction.  · Continue antibiotics   · No evidence of acute abdomen at this time  · Consider GI consultation and/or repeat CT PRN for clinical decline;     Delirium versus Metabolic encephalopathy  · Altered mental status may be secondary to acute infectious process as outlined above or delirium associated with hospitalization  · Treatment as above  · Supportive care  · Fall precautions  · Head CT negative for acute changes in outside facility.    Recurrent TIAs  · Differential diagnosis includes vertigo, CVA, TIA, subarachnoid hemorrhage, complex migraine, seizure, encephalopathy, and/or tumor  · Initiate workup for TIA/CVA  · STAT CT of head performed with no acute findings  · Obtain MRI brain and if not revealing, then MRA or CTA of head and neck to look at posterior circulation  · Bilateral carotid ultrasound  · 2-D echo with bubble study  · Obtain fasting lipids  · Statin therapyRI  · TSH, FT3, FT4   · RPR  · HgA1c  · B12  · Folate  · Hemoccult  · Give ASA  · Stroke education given and patient educated about both stroke prevention and symptoms to be mindful of. The patient was instructed to dial 911 for any signs or symptoms of stroke such as sudden weakness, numbness, dizziness, speech, gait, or visual changes  · Consult neurology for further evaluation and recommendations if symptoms persist or as clinical course dictates      Diabetes  mellitus type 2  · BG in acceptable range at this time  · Maintain w/ subcutaneous insulin management order set  · Hold oral diabetic meds  · ADA 1800 kcal diet  · BG goal while in patient is <180mg/dL  · HgA1c = Pending    Essential Hypertension  · Goal while inpatient is a systolic blood pressure less than 160mmHg  · BP in acceptable range at this time  · Continue current home regimen with hold parameters  · PRN antihypertensives available    Thyroid dysfunction   · Clinically, patient is euthyroid   · Chemically, undetermined  · Obtain TSH, free T3, and free T4  · Continue current regimen      VTE Risk Mitigation (From admission, onward)         Ordered     Place sequential compression device  Until discontinued      01/31/20 1926     IP VTE HIGH RISK PATIENT  Once      01/31/20 1926                  Adult PRN medications available   DVT prophylaxis given       DISPOSITION:     Will admit to the Hospital Medicine service for further evaluation and treatment.    Chart reviewed and updated where applicable.    High Risk Conditions:  Patient has a condition that poses threat to life and bodily function: NSTEMI      ===============================================================    Rupert John MD, MPH  Department of Hospital Medicine   [facility]  [phone] pg  (7pm - 6am)          This note is dictated using Rebyoo voice recognition software.  There are word recognition mistakes that are occasionally missed on review.

## 2020-02-01 NOTE — NURSING
Patient arrived to unit via stretcher with ambulance service from [facility]. Telemetry monitor applied and monitoring. Patient in no apparent distress. Will continue to monitor.    no

## 2020-02-01 NOTE — ASSESSMENT & PLAN NOTE
Apparent hx of CP during esophagram with new ant TWI/loss of forces on EKG with elev trop c/w NSTEMI.  Pt currently pain free.  Significant progressive dementia noted.  At present, she is a poor revasc candidate and I have recommended med rx to family.  I have also broached the topic of DNR.  They will discuss and plan to gather tomorrow am.  Cont ASA/statin.  Add Plavix.  Echo report pending.  At present juncture, no plan for isch eval/cath.

## 2020-02-01 NOTE — PLAN OF CARE
02/01/20 1342   Discharge Assessment   Assessment Type Discharge Planning Assessment   Confirmed/corrected address and phone number on facesheet? Yes   Assessment information obtained from? Patient;Caregiver  (Rosalba-daughter)   Communicated expected length of stay with patient/caregiver no   Prior to hospitilization cognitive status: Alert/Oriented   Prior to hospitalization functional status: Assistive Equipment;Needs Assistance  (Walker, wheelchair; daughter assists and transports; [facility])   Current cognitive status: Alert/Oriented   Current Functional Status: Assistive Equipment;Needs Assistance  (Walker, wheelchair; daughter assists and transports; [facility])   Facility Arrived From: Home   Lives With child(matt), adult  (Alternates between 2 daughters' homes)   Able to Return to Prior Arrangements yes   Is patient able to care for self after discharge? Yes  (With daughters' assist as needed; had [facility])   Patient's perception of discharge disposition home or selfcare;home health   Readmission Within the Last 30 Days no previous admission in last 30 days   Patient currently being followed by outpatient case management? No   Patient currently receives any other outside agency services? No  (HH)   Equipment Currently Used at Home walker, rolling;wheelchair   Do you have any problems affording any of your prescribed medications? No   Is the patient taking medications as prescribed? yes   Does the patient have transportation home? Yes   Transportation Anticipated family or friend will provide   Does the patient receive services at the [facility]? No   Discharge Plan A Home with family;Home Health  ([facility]-resume)   Discharge Plan B Other  (TBD)   DME Needed Upon Discharge  other (see comments)  (TBD)   Patient/Family in Agreement with Plan yes   Does the patient have transportation to healthcare appointments? Yes   SW Role explained to patient; two patient identifiers recognized; SW contact  information placed on Communication board. Discussed patient managing health care at home; determined who would be helping patient at home with recovery: RosalbaMolly-daughters help at home; alternates between daughters' homes monthly    PCP: Rosendo Lawrence MD Prefers afternoon appointments    Extended Emergency Contact Information  Primary Emergency Contact: Yoon Welsh   [facility]  Home Phone: [phone]  Relation: [facility] - [place], [place] - [address]  [address]  [place] [address]  Phone: [phone] Fax: [phone]    Payor: MEDICARE / Plan: MEDICARE PART A & B / Product Type: Government /

## 2020-02-01 NOTE — ASSESSMENT & PLAN NOTE
Medical management   Aspirin, stain and plavix  ECHo pending  No ischemic intervention planned at this time  Cardiac diet  Code status will be discussed further

## 2020-02-01 NOTE — CONSULTS
[facility]  Cardiology  Consult Note    Patient Name: Sandy Uriarte  MRN: [id number]  Admission Date: 1/31/2020  Hospital Length of Stay: 1 days  Code Status: Full Code   Attending Provider: Lety Rosario MD   Consulting Provider: Rupert Pepper MD  Primary Care Physician: Rosendo Lawrence MD  Principal Problem:NSTEMI (non-ST elevated myocardial infarction)    Patient information was obtained from relative(s) and ER records.     Inpatient consult to Cardiology  Consult performed by: Rupert Pepper MD  Consult ordered by: Marito Morfin Jr., NP  Reason for consult: NSTEMI        Subjective:     Chief Complaint:  FTT/colitis    HPI:   85 y.o. female that (in part)  has a past medical history of Cancer, Diabetes mellitus, Hypertension, Neuropathy, Other and unspecified hyperlipidemia, and Thyroid disease.  has a past surgical history that includes Tonsillectomy; Knee surgery; Back surgery; Cholecystectomy; Mastectomy; and Hysterectomy. Presents to [facility] as a transfer patient from [facility].  Reason for transfer request per Dr. Patel, transfer center physician :     -Ms. Uriarte is an 84yo lady with a past medical history of DM2, CVA and hypothyroidism.  She presented to [facility] on 1/27 complaining of nausea, vomiting and anorexia for several days prior to admission.  Accompanying this was some generalized abdominal pain.  This led to a CT abdomen which revealed thickening of the transverse, descending, and sigmoid colon consistent with colitis.  She had a WBC count of 15 at admission.  She was admitted for infectious colitis and UTI and was placed on Cipro and Flagyl IV.  Rocephin 1g iv q24 was added on 1/28 for possible UTI (urine sample wasnt good, but she had K.pneumo recently on an old cx sensitive to this. Cipro was stopped today due to confusion.  -Her other complaint was some dysphagia. She underwent an esophagram on 1/29  which revealed dysmotility without evidence for esophageal obstruction.  She is now combative and confused as well.  She had a CT head with no focal, acute changes.  -The sending MD is also concerned due to developing on Tuesday night with some burning in her chest, but had a normal EKG.  While she was getting her esophagram, she developed some flip Ts anterior on the telemetry, so a repeat EKG, though she had no symptoms.  Her troponin was minimally elevated to max of 0.72 and came down to 0.51 on the following exams.  She was started on ASA and therapeutic Lovenox.  She was also begun on beta blockers.  -It does take 3-5 days to get the results of an echo, so this was not done.  Additionally the family reports she was having TIA like symptoms with recurrent episodes of left-sided facial weakness and inability to close her eye.  Spontaneous resolution x2 episodes.  Family also reports progressive weakness and decreased mobility since December.    Extensive records from \[facility]\"" reviewed.  Note made of diffuse TWI on telemetry tracings (apparently during esophagram) with apparent complaints of chest discomfort.  Subsequent EKG with new loss of anterior forces with ant TWI and trop up to 0.7 at \[facility]\"".    The the patient is seen in presence of multiple family members.  She is unable to provide any history and is alert oriented times 0.  She denies any angina or dyspnea in a at present.  There does not appear to be any prior cardiac history.  I had a prolonged conversation with the patient's family in regards to further cardiac testing.  Of given the absence of any symptoms, and significant progressive dementia over the past several months, I do not feel that she would be a good revascularization candidate.  We also discussed code status, and the family plans to gather tomorrow morning to further discuss with me.  I recommended a DNR status.    Past Medical History:   Diagnosis Date    Cancer     breast cancer     Diabetes mellitus     Hypertension     Neuropathy     Other and unspecified hyperlipidemia     Thyroid disease        Past Surgical History:   Procedure Laterality Date    BACK SURGERY      CHOLECYSTECTOMY      HYSTERECTOMY      KNEE SURGERY      MASTECTOMY      right    TONSILLECTOMY         Review of patient's allergies indicates:  No Known Allergies    No current facility-administered medications on file prior to encounter.      Current Outpatient Medications on File Prior to Encounter   Medication Sig    buPROPion (WELLBUTRIN XL) 150 MG TB24 tablet 1 daily for mood    cyanocobalamin 1,000 mcg/mL injection Inject 1 mL every 2 weeks by intramuscular route as directed.    diphenoxylate-atropine 2.5-0.025 mg (LOMOTIL) 2.5-0.025 mg per tablet TAKE ONE TABLET BY MOUTH FOUR TIMES DAILY AS NEEDED LOOSE BOWELS    glimepiride (AMARYL) 2 MG tablet Take 1 tablet(s) every day by oral route.    metformin (GLUCOPHAGE) 500 MG tablet take one-half tablet by mouth 4 times daily for diabetes    metoprolol succinate (TOPROL-XL) 50 MG 24 hr tablet take one and one-half tablet by mouth daily for high blood pressure    oxybutynin (DITROPAN) 5 MG Tab Take 5 mg by mouth 2 (two) times daily.    simvastatin (ZOCOR) 20 MG tablet one tablet by mouth daily    sulfamethoxazole-trimethoprim 800-160mg (BACTRIM DS) 800-160 mg Tab Take 0.5 tablets by mouth once daily.      Family History     None        Tobacco Use    Smoking status: Never Smoker    Smokeless tobacco: Never Used   Substance and Sexual Activity    Alcohol use: Yes    Drug use: Not on file    Sexual activity: Not on file     Review of Systems   Unable to perform ROS: dementia     Objective:     Vital Signs (Most Recent):  Temp: 97.6 °F (36.4 °C) (02/01/20 0746)  Pulse: 92 (02/01/20 0746)  Resp: 17 (02/01/20 0746)  BP: (!) 126/58 (02/01/20 0746)  SpO2: 100 % (02/01/20 0746) Vital Signs (24h Range):  Temp:  [97.6 °F (36.4 °C)-98.9 °F (37.2 °C)] 97.6 °F (36.4  °C)  Pulse:  [] 92  Resp:  [16-18] 17  SpO2:  [97 %-100 %] 100 %  BP: (123-150)/(29-84) 126/58     Weight: 56.1 kg (123 lb 10.9 oz)  Body mass index is 24.15 kg/m².    SpO2: 100 %  O2 Device (Oxygen Therapy): room air      Intake/Output Summary (Last 24 hours) at 2/1/2020 0846  Last data filed at 2/1/2020 0521  Gross per 24 hour   Intake 300 ml   Output --   Net 300 ml       Lines/Drains/Airways     None                 Physical Exam   Constitutional: She appears well-developed and well-nourished. No distress.   HENT:   Head: Normocephalic and atraumatic.   Mouth/Throat: No oropharyngeal exudate.   Eyes: Pupils are equal, round, and reactive to light. Conjunctivae and EOM are normal. No scleral icterus.   Neck: Normal range of motion. Neck supple. No JVD present. No tracheal deviation present. No thyromegaly present.   Cardiovascular: Normal rate, regular rhythm, S1 normal and S2 normal. Exam reveals no gallop and no friction rub.   No murmur heard.  Pulmonary/Chest: Effort normal and breath sounds normal. No respiratory distress. She has no wheezes. She has no rales. She exhibits no tenderness.   Abdominal: Soft. Bowel sounds are normal. There is no tenderness.   Musculoskeletal: Normal range of motion. She exhibits no edema.   Neurological: She is alert. No cranial nerve deficit.   Oriented x0   Skin: Skin is warm and dry. She is not diaphoretic.   Psychiatric:   UTO       Current Medications:   aspirin  81 mg Oral Daily    buPROPion  150 mg Oral Daily    ciprofloxacin  400 mg Intravenous Q12H    lisinopril  10 mg Oral Daily    magnesium sulfate IVPB  1 g Intravenous Q2H    metoprolol succinate  50 mg Oral Daily    metronidazole  500 mg Intravenous Q8H    oxybutynin  5 mg Oral BID    polyethylene glycol  17 g Oral Daily    simvastatin  20 mg Oral QHS       acetaminophen, dextrose 50%, dextrose 50%, glucagon (human recombinant), glucose, glucose, influenza, insulin aspart U-100, morphine,  nitroGLYCERIN, ondansetron, oxyCODONE-acetaminophen, oxyCODONE-acetaminophen, pneumoc 13-tristan conj-dip cr(PF), sodium chloride 0.9%    Laboratory (all labs reviewed):  CBC:  Recent Labs   Lab 01/31/20 2001 02/01/20  0125   WBC 13.10 H 13.39 H   Hemoglobin 11.7 L 11.8 L   Hematocrit 34.5 L 35.0 L   Platelets 195 191       CHEMISTRIES:  Recent Labs   Lab 04/11/17  1511 01/31/20 2001 02/01/20  0125   Glucose  --  136 H 164 H   Sodium  --  136 137   Potassium  --  3.7 3.5   BUN, Bld  --  8 7 L   Creatinine 1.0 0.8 0.8   eGFR if African American >60 >60 >60   eGFR if non  53 A >60 >60   Calcium  --  8.4 L 8.5 L   Magnesium  --   --  0.9 LL       CARDIAC BIOMARKERS:  Recent Labs   Lab 01/31/20 2001 02/01/20  0125   Troponin I 0.134 H  0.134 H 0.118 H       COAGS:        LIPIDS/LFTS:  Recent Labs   Lab 01/31/20 2001 02/01/20  0125   Cholesterol 77 L  --    Triglycerides 139  --    HDL 32 L  --    LDL Cholesterol 17.2 L  --    Non-HDL Cholesterol 45  --    AST  --  20   ALT  --  14       BNP:  Recent Labs   Lab 01/31/20 2001    H       TSH:  Recent Labs   Lab 01/31/20 2001   TSH 0.912       Free T4:  Recent Labs   Lab 01/31/20 2001   Free T4 1.22       Diagnostic Results:  ECG (personally reviewed and interpreted tracing(s)):  1/28/20 2333 (OLOS): SR 89, low volt  1/29/20 1843 (OLOS): , ant TWI, PRWP, c/r/o AWMI    Echo: 5/22/17 (repeat ordered)    1 - Eccentric hypertrophy.     2 - Normal left ventricular systolic function (EF 55-60%).     3 - Akinetic inferolateral wall.     4 - Normal left ventricular diastolic function.     5 - Normal right ventricular systolic function .     6 - See comments regarding bubble study: (insuff opacification of RA to r/o shunt).    Carotid US 5/22/17 (repeat ordered)  No ultrasound evidence of hemodynamically significant stenosis.      Assessment and Plan:     * NSTEMI (non-ST elevated myocardial infarction)  Apparent hx of CP during esophagram with new  ant TWI/loss of forces on EKG with elev trop c/w NSTEMI.  Pt currently pain free.  Significant progressive dementia noted.  At present, she is a poor revasc candidate and I have recommended med rx to family.  I have also broached the topic of DNR.  They will discuss and plan to gather tomorrow am.  Cont ASA/statin.  Add Plavix.  Echo report pending.  At present juncture, no plan for isch eval/cath.    Acute colitis  Per IM    Diabetes mellitus, type 2  Per IM    Essential hypertension  Cont med rx        VTE Risk Mitigation (From admission, onward)         Ordered     Place sequential compression device  Until discontinued      01/31/20 1926     IP VTE HIGH RISK PATIENT  Once      01/31/20 1926              Extensive review of records and prolonged discussion with family at bedside.  Case d/w Dr. Rosario.    Thank you for your consult. I will follow-up with patient. Please contact us if you have any additional questions.    Rupert Pepper MD  Cardiology   [facility]

## 2020-02-01 NOTE — HPI
85 y.o. female that (in part)  has a past medical history of Cancer, Diabetes mellitus, Hypertension, Neuropathy, Other and unspecified hyperlipidemia, and Thyroid disease.  has a past surgical history that includes Tonsillectomy; Knee surgery; Back surgery; Cholecystectomy; Mastectomy; and Hysterectomy. Presents to [facility] as a transfer patient from [facility].  Reason for transfer request per Dr. Patel, transfer center physician :     -Ms. Uriarte is an 84yo lady with a past medical history of DM2, CVA and hypothyroidism.  She presented to [facility] on 1/27 complaining of nausea, vomiting and anorexia for several days prior to admission.  Accompanying this was some generalized abdominal pain.  This led to a CT abdomen which revealed thickening of the transverse, descending, and sigmoid colon consistent with colitis.  She had a WBC count of 15 at admission.  She was admitted for infectious colitis and UTI and was placed on Cipro and Flagyl IV.  Rocephin 1g iv q24 was added on 1/28 for possible UTI (urine sample wasnt good, but she had K.pneumo recently on an old cx sensitive to this. Cipro was stopped today due to confusion.  -Her other complaint was some dysphagia. She underwent an esophagram on 1/29 which revealed dysmotility without evidence for esophageal obstruction.  She is now combative and confused as well.  She had a CT head with no focal, acute changes.  -The sending MD is also concerned due to developing on Tuesday night with some burning in her chest, but had a normal EKG.  While she was getting her esophagram, she developed some flip Ts anterior on the telemetry, so a repeat EKG, though she had no symptoms.  Her troponin was minimally elevated to max of 0.72 and came down to 0.51 on the following exams.  She was started on ASA and therapeutic Lovenox.  She was also begun on beta blockers.  -It does take 3-5 days to get the results of an echo, so  this was not done.  Additionally the family reports she was having TIA like symptoms with recurrent episodes of left-sided facial weakness and inability to close her eye.  Spontaneous resolution x2 episodes.  Family also reports progressive weakness and decreased mobility since December.    Extensive records from [facility] reviewed.  Note made of diffuse TWI on telemetry tracings (apparently during esophagram) with apparent complaints of chest discomfort.  Subsequent EKG with new loss of anterior forces with ant TWI and trop up to 0.7 at [facility].    The the patient is seen in presence of multiple family members.  She is unable to provide any history and is alert oriented times 0.  She denies any angina or dyspnea in a at present.  There does not appear to be any prior cardiac history.  I had a prolonged conversation with the patient's family in regards to further cardiac testing.  Of given the absence of any symptoms, and significant progressive dementia over the past several months, I do not feel that she would be a good revascularization candidate.  We also discussed code status, and the family plans to gather tomorrow morning to further discuss with me.  I recommended a DNR status.

## 2020-02-01 NOTE — HOSPITAL COURSE
Pt transferred from [facility] for cardiac and neurological evaluation. Initially admitted with colitis and AMS, possible CVA. EKG changes noted while undergoing an esophogram and troponins elevated. Transferred to [facility].     Pt continued on flagyl and rocephin. Brain MRI and carotid u/s.  Medical treatment for NSTEMI.     2/2- pt family at bedside and have already met with Cardiology and neurology. They are in agreement with DNR. Order placed. Lapost signed. Will proceed with PT/OT eval prior to dc home- family having difficulty physically handling patient and would like recommendations on any home equipment they made need, pt has HH and will consider AIM. SW notified. Plan for dc tomorrow. Medical therapy - no further interventions   2/3- dc home with HH and AIM. Continue cipro + flagyl to complete course for colitis.

## 2020-02-01 NOTE — SUBJECTIVE & OBJECTIVE
Past Medical History:   Diagnosis Date    Cancer     breast cancer    Diabetes mellitus     Hypertension     Neuropathy     Other and unspecified hyperlipidemia     Thyroid disease        Past Surgical History:   Procedure Laterality Date    BACK SURGERY      CHOLECYSTECTOMY      HYSTERECTOMY      KNEE SURGERY      MASTECTOMY      right    TONSILLECTOMY         Review of patient's allergies indicates:  No Known Allergies    No current facility-administered medications on file prior to encounter.      Current Outpatient Medications on File Prior to Encounter   Medication Sig    buPROPion (WELLBUTRIN XL) 150 MG TB24 tablet 1 daily for mood    cyanocobalamin 1,000 mcg/mL injection Inject 1 mL every 2 weeks by intramuscular route as directed.    diphenoxylate-atropine 2.5-0.025 mg (LOMOTIL) 2.5-0.025 mg per tablet TAKE ONE TABLET BY MOUTH FOUR TIMES DAILY AS NEEDED LOOSE BOWELS    glimepiride (AMARYL) 2 MG tablet Take 1 tablet(s) every day by oral route.    metformin (GLUCOPHAGE) 500 MG tablet take one-half tablet by mouth 4 times daily for diabetes    metoprolol succinate (TOPROL-XL) 50 MG 24 hr tablet take one and one-half tablet by mouth daily for high blood pressure    oxybutynin (DITROPAN) 5 MG Tab Take 5 mg by mouth 2 (two) times daily.    simvastatin (ZOCOR) 20 MG tablet one tablet by mouth daily    sulfamethoxazole-trimethoprim 800-160mg (BACTRIM DS) 800-160 mg Tab Take 0.5 tablets by mouth once daily.      Family History     None        Tobacco Use    Smoking status: Never Smoker    Smokeless tobacco: Never Used   Substance and Sexual Activity    Alcohol use: Yes    Drug use: Not on file    Sexual activity: Not on file     Review of Systems   Unable to perform ROS: dementia     Objective:     Vital Signs (Most Recent):  Temp: 97.6 °F (36.4 °C) (02/01/20 0746)  Pulse: 92 (02/01/20 0746)  Resp: 17 (02/01/20 0746)  BP: (!) 126/58 (02/01/20 0746)  SpO2: 100 % (02/01/20 0746) Vital Signs  (24h Range):  Temp:  [97.6 °F (36.4 °C)-98.9 °F (37.2 °C)] 97.6 °F (36.4 °C)  Pulse:  [] 92  Resp:  [16-18] 17  SpO2:  [97 %-100 %] 100 %  BP: (123-150)/(29-84) 126/58     Weight: 56.1 kg (123 lb 10.9 oz)  Body mass index is 24.15 kg/m².    SpO2: 100 %  O2 Device (Oxygen Therapy): room air      Intake/Output Summary (Last 24 hours) at 2/1/2020 0878  Last data filed at 2/1/2020 0521  Gross per 24 hour   Intake 300 ml   Output --   Net 300 ml       Lines/Drains/Airways     None                 Physical Exam   Constitutional: She appears well-developed and well-nourished. No distress.   HENT:   Head: Normocephalic and atraumatic.   Mouth/Throat: No oropharyngeal exudate.   Eyes: Pupils are equal, round, and reactive to light. Conjunctivae and EOM are normal. No scleral icterus.   Neck: Normal range of motion. Neck supple. No JVD present. No tracheal deviation present. No thyromegaly present.   Cardiovascular: Normal rate, regular rhythm, S1 normal and S2 normal. Exam reveals no gallop and no friction rub.   No murmur heard.  Pulmonary/Chest: Effort normal and breath sounds normal. No respiratory distress. She has no wheezes. She has no rales. She exhibits no tenderness.   Abdominal: Soft. Bowel sounds are normal. There is no tenderness.   Musculoskeletal: Normal range of motion. She exhibits no edema.   Neurological: She is alert. No cranial nerve deficit.   Oriented x0   Skin: Skin is warm and dry. She is not diaphoretic.   Psychiatric:   UTO       Current Medications:   aspirin  81 mg Oral Daily    buPROPion  150 mg Oral Daily    ciprofloxacin  400 mg Intravenous Q12H    lisinopril  10 mg Oral Daily    magnesium sulfate IVPB  1 g Intravenous Q2H    metoprolol succinate  50 mg Oral Daily    metronidazole  500 mg Intravenous Q8H    oxybutynin  5 mg Oral BID    polyethylene glycol  17 g Oral Daily    simvastatin  20 mg Oral QHS       acetaminophen, dextrose 50%, dextrose 50%, glucagon (human  recombinant), glucose, glucose, influenza, insulin aspart U-100, morphine, nitroGLYCERIN, ondansetron, oxyCODONE-acetaminophen, oxyCODONE-acetaminophen, pneumoc 13-tristan conj-dip cr(PF), sodium chloride 0.9%    Laboratory (all labs reviewed):  CBC:  Recent Labs   Lab 01/31/20 2001 02/01/20  0125   WBC 13.10 H 13.39 H   Hemoglobin 11.7 L 11.8 L   Hematocrit 34.5 L 35.0 L   Platelets 195 191       CHEMISTRIES:  Recent Labs   Lab 04/11/17  1511 01/31/20 2001 02/01/20  0125   Glucose  --  136 H 164 H   Sodium  --  136 137   Potassium  --  3.7 3.5   BUN, Bld  --  8 7 L   Creatinine 1.0 0.8 0.8   eGFR if African American >60 >60 >60   eGFR if non  53 A >60 >60   Calcium  --  8.4 L 8.5 L   Magnesium  --   --  0.9 LL       CARDIAC BIOMARKERS:  Recent Labs   Lab 01/31/20 2001 02/01/20  0125   Troponin I 0.134 H  0.134 H 0.118 H       COAGS:        LIPIDS/LFTS:  Recent Labs   Lab 01/31/20 2001 02/01/20  0125   Cholesterol 77 L  --    Triglycerides 139  --    HDL 32 L  --    LDL Cholesterol 17.2 L  --    Non-HDL Cholesterol 45  --    AST  --  20   ALT  --  14       BNP:  Recent Labs   Lab 01/31/20 2001    H       TSH:  Recent Labs   Lab 01/31/20 2001   TSH 0.912       Free T4:  Recent Labs   Lab 01/31/20 2001   Free T4 1.22       Diagnostic Results:  ECG (personally reviewed and interpreted tracing(s)):  1/28/20 2333 (OLOS): SR 89, low volt  1/29/20 1843 (OLOS): , ant TWI, PRWP, c/r/o AWMI    Echo: 5/22/17 (repeat ordered)    1 - Eccentric hypertrophy.     2 - Normal left ventricular systolic function (EF 55-60%).     3 - Akinetic inferolateral wall.     4 - Normal left ventricular diastolic function.     5 - Normal right ventricular systolic function .     6 - See comments regarding bubble study: (insuff opacification of RA to r/o shunt).    Carotid US 5/22/17 (repeat ordered)  No ultrasound evidence of hemodynamically significant stenosis.

## 2020-02-02 PROBLEM — Z71.89 DNR (DO NOT RESUSCITATE) DISCUSSION: Status: ACTIVE | Noted: 2020-02-02

## 2020-02-02 LAB
BASOPHILS # BLD AUTO: 0.04 K/UL (ref 0–0.2)
BASOPHILS NFR BLD: 0.4 % (ref 0–1.9)
DIFFERENTIAL METHOD: ABNORMAL
EOSINOPHIL # BLD AUTO: 0.1 K/UL (ref 0–0.5)
EOSINOPHIL NFR BLD: 1.1 % (ref 0–8)
ERYTHROCYTE [DISTWIDTH] IN BLOOD BY AUTOMATED COUNT: 12.8 % (ref 11.5–14.5)
HCT VFR BLD AUTO: 34.9 % (ref 37–48.5)
HGB BLD-MCNC: 11.8 G/DL (ref 12–16)
IMM GRANULOCYTES # BLD AUTO: 0.09 K/UL (ref 0–0.04)
IMM GRANULOCYTES NFR BLD AUTO: 0.8 % (ref 0–0.5)
LYMPHOCYTES # BLD AUTO: 2.4 K/UL (ref 1–4.8)
LYMPHOCYTES NFR BLD: 21.7 % (ref 18–48)
MCH RBC QN AUTO: 28.4 PG (ref 27–31)
MCHC RBC AUTO-ENTMCNC: 33.8 G/DL (ref 32–36)
MCV RBC AUTO: 84 FL (ref 82–98)
MONOCYTES # BLD AUTO: 1 K/UL (ref 0.3–1)
MONOCYTES NFR BLD: 8.5 % (ref 4–15)
NEUTROPHILS # BLD AUTO: 7.6 K/UL (ref 1.8–7.7)
NEUTROPHILS NFR BLD: 67.5 % (ref 38–73)
NRBC BLD-RTO: 0 /100 WBC
PLATELET # BLD AUTO: 197 K/UL (ref 150–350)
PMV BLD AUTO: 12.3 FL (ref 9.2–12.9)
POCT GLUCOSE: 211 MG/DL (ref 70–110)
POCT GLUCOSE: 216 MG/DL (ref 70–110)
POCT GLUCOSE: 221 MG/DL (ref 70–110)
POCT GLUCOSE: 224 MG/DL (ref 70–110)
RBC # BLD AUTO: 4.16 M/UL (ref 4–5.4)
WBC # BLD AUTO: 11.24 K/UL (ref 3.9–12.7)

## 2020-02-02 PROCEDURE — 85025 COMPLETE CBC W/AUTO DIFF WBC: CPT

## 2020-02-02 PROCEDURE — 99222 1ST HOSP IP/OBS MODERATE 55: CPT | Mod: ,,, | Performed by: PSYCHIATRY & NEUROLOGY

## 2020-02-02 PROCEDURE — 25000003 PHARM REV CODE 250: Performed by: INTERNAL MEDICINE

## 2020-02-02 PROCEDURE — 25000003 PHARM REV CODE 250: Performed by: HOSPITALIST

## 2020-02-02 PROCEDURE — 21400001 HC TELEMETRY ROOM

## 2020-02-02 PROCEDURE — 99233 PR SUBSEQUENT HOSPITAL CARE,LEVL III: ICD-10-PCS | Mod: ,,, | Performed by: INTERNAL MEDICINE

## 2020-02-02 PROCEDURE — 99233 SBSQ HOSP IP/OBS HIGH 50: CPT | Mod: ,,, | Performed by: INTERNAL MEDICINE

## 2020-02-02 PROCEDURE — 36415 COLL VENOUS BLD VENIPUNCTURE: CPT

## 2020-02-02 PROCEDURE — 63600175 PHARM REV CODE 636 W HCPCS: Performed by: HOSPITALIST

## 2020-02-02 PROCEDURE — 94761 N-INVAS EAR/PLS OXIMETRY MLT: CPT

## 2020-02-02 PROCEDURE — 99222 PR INITIAL HOSPITAL CARE,LEVL II: ICD-10-PCS | Mod: ,,, | Performed by: PSYCHIATRY & NEUROLOGY

## 2020-02-02 RX ORDER — MAGNESIUM SULFATE 1 G/100ML
1 INJECTION INTRAVENOUS ONCE
Status: COMPLETED | OUTPATIENT
Start: 2020-02-02 | End: 2020-02-02

## 2020-02-02 RX ORDER — TALC
9 POWDER (GRAM) TOPICAL NIGHTLY PRN
Status: DISCONTINUED | OUTPATIENT
Start: 2020-02-02 | End: 2020-02-03 | Stop reason: HOSPADM

## 2020-02-02 RX ORDER — LEVOTHYROXINE SODIUM 50 UG/1
50 TABLET ORAL
Status: DISCONTINUED | OUTPATIENT
Start: 2020-02-03 | End: 2020-02-03 | Stop reason: HOSPADM

## 2020-02-02 RX ORDER — METOPROLOL SUCCINATE 50 MG/1
100 TABLET, EXTENDED RELEASE ORAL DAILY
Status: DISCONTINUED | OUTPATIENT
Start: 2020-02-02 | End: 2020-02-03 | Stop reason: HOSPADM

## 2020-02-02 RX ADMIN — INSULIN ASPART 4 UNITS: 100 INJECTION, SOLUTION INTRAVENOUS; SUBCUTANEOUS at 10:02

## 2020-02-02 RX ADMIN — SIMVASTATIN 20 MG: 10 TABLET, FILM COATED ORAL at 08:02

## 2020-02-02 RX ADMIN — BUPROPION HYDROCHLORIDE 150 MG: 150 TABLET, FILM COATED, EXTENDED RELEASE ORAL at 10:02

## 2020-02-02 RX ADMIN — ASPIRIN 81 MG: 81 TABLET, COATED ORAL at 10:02

## 2020-02-02 RX ADMIN — METRONIDAZOLE 500 MG: 250 TABLET ORAL at 06:02

## 2020-02-02 RX ADMIN — OXYBUTYNIN CHLORIDE 5 MG: 5 TABLET ORAL at 10:02

## 2020-02-02 RX ADMIN — CLOPIDOGREL BISULFATE 75 MG: 75 TABLET ORAL at 10:02

## 2020-02-02 RX ADMIN — OXYBUTYNIN CHLORIDE 5 MG: 5 TABLET ORAL at 08:02

## 2020-02-02 RX ADMIN — MAGNESIUM SULFATE 1 G: 1 INJECTION INTRAVENOUS at 01:02

## 2020-02-02 RX ADMIN — CEFTRIAXONE 1 G: 1 INJECTION, SOLUTION INTRAVENOUS at 01:02

## 2020-02-02 RX ADMIN — METRONIDAZOLE 500 MG: 250 TABLET ORAL at 09:02

## 2020-02-02 RX ADMIN — METRONIDAZOLE 500 MG: 250 TABLET ORAL at 01:02

## 2020-02-02 RX ADMIN — METOPROLOL SUCCINATE 100 MG: 50 TABLET, FILM COATED, EXTENDED RELEASE ORAL at 10:02

## 2020-02-02 RX ADMIN — LISINOPRIL 10 MG: 5 TABLET ORAL at 10:02

## 2020-02-02 RX ADMIN — Medication 9 MG: at 08:02

## 2020-02-02 NOTE — HOSPITAL COURSE
Interval Hx: pt seen with mult family members.  No cp/sob/palps.  Still AAO x zero.  Discussed advanced directives at length with mult family members and everybody in agreement re: DNR.  Case d/w Aquiles Rosario and Candelario.    Tele: SR 80-120s (personally reviewed and interpreted)

## 2020-02-02 NOTE — PROGRESS NOTES
[facility]  Cardiology  Progress Note    Patient Name: Sandy Uriarte  MRN: [id number]  Admission Date: 1/31/2020  Hospital Length of Stay: 2 days  Code Status: Full Code   Attending Physician: Lety Rosario MD   Primary Care Physician: Rosendo Lawrence MD  Expected Discharge Date:   Principal Problem:NSTEMI (non-ST elevated myocardial infarction)    Subjective:     Interval Hx: pt seen with mult family members.  No cp/sob/palps.  Still AAO x zero.  Discussed advanced directives at length with [facility] family members and everybody in agreement re: DNR.  Case d/w Aquiles Rosario and Candelario.    Tele: SR 80-120s (personally reviewed and interpreted)      Past Medical History:   Diagnosis Date    Cancer     breast cancer    Diabetes mellitus     Hypertension     Neuropathy     Other and unspecified hyperlipidemia     Thyroid disease        Past Surgical History:   Procedure Laterality Date    BACK SURGERY      CHOLECYSTECTOMY      HYSTERECTOMY      KNEE SURGERY      MASTECTOMY      right    TONSILLECTOMY         Review of patient's allergies indicates:  No Known Allergies    No current facility-administered medications on file prior to encounter.      Current Outpatient Medications on File Prior to Encounter   Medication Sig    buPROPion (WELLBUTRIN XL) 150 MG TB24 tablet 1 daily for mood    cyanocobalamin 1,000 mcg/mL injection Inject 1 mL every 2 weeks by intramuscular route as directed.    diphenoxylate-atropine 2.5-0.025 mg (LOMOTIL) 2.5-0.025 mg per tablet TAKE ONE TABLET BY MOUTH FOUR TIMES DAILY AS NEEDED LOOSE BOWELS    glimepiride (AMARYL) 2 MG tablet Take 1 tablet(s) every day by oral route.    metformin (GLUCOPHAGE) 500 MG tablet take one-half tablet by mouth 4 times daily for diabetes    metoprolol succinate (TOPROL-XL) 50 MG 24 hr tablet take one and one-half tablet by mouth daily for high blood pressure    oxybutynin (DITROPAN) 5 MG Tab Take 5 mg by mouth 2 (two) times  daily.    simvastatin (ZOCOR) 20 MG tablet one tablet by mouth daily    sulfamethoxazole-trimethoprim 800-160mg (BACTRIM DS) 800-160 mg Tab Take 0.5 tablets by mouth once daily.      Family History     None        Tobacco Use    Smoking status: Never Smoker    Smokeless tobacco: Never Used   Substance and Sexual Activity    Alcohol use: Yes    Drug use: Not on file    Sexual activity: Not on file     Review of Systems   Unable to perform ROS: dementia     Objective:     Vital Signs (Most Recent):  Temp: 98.7 °F (37.1 °C) (02/02/20 0817)  Pulse: 96 (02/02/20 0817)  Resp: 16 (02/02/20 0817)  BP: (!) 160/75 (02/02/20 0817)  SpO2: 98 % (02/02/20 0817) Vital Signs (24h Range):  Temp:  [97.3 °F (36.3 °C)-98.7 °F (37.1 °C)] 98.7 °F (37.1 °C)  Pulse:  [79-96] 96  Resp:  [16-18] 16  SpO2:  [92 %-98 %] 98 %  BP: ()/(54-75) 160/75     Weight: 56.1 kg (123 lb 10.9 oz)  Body mass index is 24.15 kg/m².    SpO2: 98 %  O2 Device (Oxygen Therapy): room air      Intake/Output Summary (Last 24 hours) at 2/2/2020 0939  Last data filed at 2/1/2020 1700  Gross per 24 hour   Intake 300 ml   Output --   Net 300 ml       Lines/Drains/Airways     Peripheral Intravenous Line                 Midline Catheter Insertion/Assessment  - Single Lumen 02/01/20 2120 Left basilic vein (medial side of arm) other (see comments) less than 1 day              Exam unchanged vs 2/1/20  Physical Exam   Constitutional: She appears well-developed and well-nourished. No distress.   HENT:   Head: Normocephalic and atraumatic.   Mouth/Throat: No oropharyngeal exudate.   Eyes: Pupils are equal, round, and reactive to light. Conjunctivae and EOM are normal. No scleral icterus.   Neck: Normal range of motion. Neck supple. No JVD present. No tracheal deviation present. No thyromegaly present.   Cardiovascular: Normal rate, regular rhythm, S1 normal and S2 normal. Exam reveals no gallop and no friction rub.   No murmur heard.  Pulmonary/Chest: Effort  normal and breath sounds normal. No respiratory distress. She has no wheezes. She has no rales. She exhibits no tenderness.   Abdominal: Soft. Bowel sounds are normal. There is no tenderness.   Musculoskeletal: Normal range of motion. She exhibits no edema.   Neurological: She is alert. No cranial nerve deficit.   Oriented x0   Skin: Skin is warm and dry. She is not diaphoretic.   Psychiatric:   UTO       Current Medications:   aspirin  81 mg Oral Daily    buPROPion  150 mg Oral Daily    cefTRIAXone (ROCEPHIN) IVPB  1 g Intravenous Q24H    clopidogreL  75 mg Oral Daily    lisinopril  10 mg Oral Daily    metoprolol succinate  50 mg Oral Daily    metroNIDAZOLE  500 mg Oral Q8H    oxybutynin  5 mg Oral BID    polyethylene glycol  17 g Oral Daily    simvastatin  20 mg Oral QHS       acetaminophen, dextrose 50%, dextrose 50%, glucagon (human recombinant), glucose, glucose, influenza, insulin aspart U-100, morphine, nitroGLYCERIN, ondansetron, oxyCODONE-acetaminophen, oxyCODONE-acetaminophen, pneumoc 13-tristan conj-dip cr(PF), sodium chloride 0.9%    Laboratory (all labs reviewed):  CBC:  Recent Labs   Lab 01/31/20 2001 02/01/20 0125 02/02/20  0521   WBC 13.10 H 13.39 H 11.24   Hemoglobin 11.7 L 11.8 L 11.8 L   Hematocrit 34.5 L 35.0 L 34.9 L   Platelets 195 191 197       CHEMISTRIES:  Recent Labs   Lab 04/11/17  1511 01/31/20 2001 02/01/20  0125 02/01/20  1137   Glucose  --  136 H 164 H  --    Sodium  --  136 137  --    Potassium  --  3.7 3.5  --    BUN, Bld  --  8 7 L  --    Creatinine 1.0 0.8 0.8  --    eGFR if  >60 >60 >60  --    eGFR if non  53 A >60 >60  --    Calcium  --  8.4 L 8.5 L  --    Magnesium  --   --  0.9 LL 1.5 L       CARDIAC BIOMARKERS:  Recent Labs   Lab 01/31/20 2001 02/01/20  0125 02/01/20  0810   Troponin I 0.134 H  0.134 H 0.118 H 0.110 H       COAGS:        LIPIDS/LFTS:  Recent Labs   Lab 01/31/20 2001 02/01/20  0125   Cholesterol 77 L  --     Triglycerides 139  --    HDL 32 L  --    LDL Cholesterol 17.2 L  --    Non-HDL Cholesterol 45  --    AST  --  20   ALT  --  14       BNP:  Recent Labs   Lab 01/31/20 2001    H       TSH:  Recent Labs   Lab 01/31/20 2001   TSH 0.912       Free T4:  Recent Labs   Lab 01/31/20 2001   Free T4 1.22       Diagnostic Results:  ECG (personally reviewed and interpreted tracing(s)):  1/28/20 2333 ([facility]): SR 89, low volt  1/29/20 1843 ([facility]): , ant TWI, PRWP, c/r/o AWMI    Echo: 2/1/20 (images personally reviewed and interpreted)  Mildly decreased left ventricular systolic function. The estimated ejection fraction is 40%.  Akinesis of mid-apical anterior/septal walls c/w LAD territory MI.  Grade I (mild) left ventricular diastolic dysfunction consistent with impaired relaxation.  Normal right ventricular systolic function.  The estimated PA systolic pressure is 29 mmHg.  Small posterior pericardial effusion without hemodynamic compromise.    Carotid US 2/1/20  Marginally elevated velocities with increased ICA to CCA ratio in the distal left ICA suggesting stenosis on the lower end of the 50-69% range.  However, this is not corroborated by grayscale findings.  Findings therefore may relate to tortuosity of the vessel.  Consider attention on follow-up as indicated.      Assessment and Plan:     * NSTEMI (non-ST elevated myocardial infarction)  Apparent hx of CP during esophagram with new ant TWI/loss of forces on EKG with elev trop c/w NSTEMI.  On my interview today, she also reports an episode of prolonged CP 2 months ago.  Echo c/w LAD MI, ?acuity.  EF 40%.  Pt currently pain free.  Significant progressive dementia noted.  Still AAO x zero.  At present, she is a poor revasc candidate and I have recommended med rx to family.  Cont ASA/statin/Plavix.    Discussed advanced directives at length with mult family members and everybody in agreement re: DNR.  Case d/w Aquiles Rosario and Candelario.      DNR (do not  resuscitate) discussion  Discussed advanced directives at length with mult family members and everybody in agreement re: DNR.    Case d/w Aquiles Rosario and Candelario.    Diabetes mellitus, type 2  Per IM    Acute colitis  Per IM    Essential hypertension  Cont med rx  Titrate BBL/ACEi as BP/HR will allow        VTE Risk Mitigation (From admission, onward)         Ordered     Place sequential compression device  Until discontinued      01/31/20 1926     IP VTE HIGH RISK PATIENT  Once      01/31/20 1926              Cardiology will sign off, pls call with questions.    Rupert Pepper MD  Cardiology  [facility]

## 2020-02-02 NOTE — PROGRESS NOTES
[facility] Hospital Medicine  Progress Note    Patient Name: Sandy Uriarte  MRN: [id number]  Patient Class: IP- Inpatient   Admission Date: 1/31/2020  Length of Stay: 2 days  Attending Physician: Lety Rosario MD  Primary Care Provider: Rosendo Lawrence MD        Subjective:     Principal Problem:NSTEMI (non-ST elevated myocardial infarction)        HPI:    Sandy Uriarte is a 85 y.o. female that (in part)  has a past medical history of Cancer, Diabetes mellitus, Hypertension, Neuropathy, Other and unspecified hyperlipidemia, and Thyroid disease.  has a past surgical history that includes Tonsillectomy; Knee surgery; Back surgery; Cholecystectomy; Mastectomy; and Hysterectomy. Presents to [facility] as a transfer patient from [facility].    Reason for transfer request per Dr. Patel, transfer center physician :     Ms. Uriarte is an 86yo lady with a past medical history of DM2, CVA and hypothyroidism.  She presented to [facility] on 1/27 complaining of nausea, vomiting and anorexia for several days prior to admission.  Accompanying this was some generalized abdominal pain.  This led to a CT abdomen which revealed thickening of the transverse, descending, and sigmoid colon consistent with colitis.  She had a WBC count of 15 at admission.  She was admitted for infectious colitis and UTI and was placed on Cipro and Flagyl IV.  Rocephin 1g iv q24 was added on 1/28 for possible UTI (urine sample wasnt good, but she had K.pneumo recently on an old cx sensitive to this. Cipro was stopped today due to confusion.     Her other complaint was some dysphagia. She underwent an esophagram on 1/29 which revealed dysmotility without evidence for esophageal obstruction.  She is now combative and confused as well.  She had a CT head with no focal, acute changes.     The sending MD is also concerned due to developing on Tuesday night with some burning in her chest,  but had a normal EKG.  While she was getting her esophagram, she developed some flip Ts anterior on the telemetry, so a repeat EKG, though she had no symptoms.  Her troponin was minimally elevated to max of 0.72 and came down to 0.51 on the following exams.  She was started on ASA and therapeutic Lovenox.  She was also begun on beta blockers.     It does take 3-5 days to get the results of an echo, so this was not done.    Additionally the family reports she was having TIA like symptoms with recurrent episodes of left-sided facial weakness and inability to close her eye.  Spontaneous resolution x2 episodes.  Family also reports progressive weakness and decreased mobility since December.    Hospital medicine has been asked to admit to inpatient for further evaluation and treatment.       Overview/Hospital Course:  Pt transferred from [facility] for cardiac and neurological evaluation. Initially admitted with colitis and AMS, possible CVA. EKG changes noted while undergoing an esophogram and troponins elevated. Transferred to [facility].     Pt continued on flagyl and rocephin. Brain MRI and carotid u/s.  Medical treatment for NSTEMI.     2/2- pt family at bedside and have already met with Cardiology and neurology. They are in agreement with DNR. Order placed. Lapost signed. Will proceed with PT/OT eval prior to dc home- family having difficulty physically handling patient and would like recommendations on any home equipment they made need, pt has HH and will consider AIM. SW notified. Plan for dc tomorrow. Medical therapy - no further interventions     Interval History: pt eating lunch, no c/o abdominal pain, denies CP    Review of Systems   Unable to perform ROS: Dementia   Endocrine: Negative for cold intolerance.     Objective:     Vital Signs (Most Recent):  Temp: 97.1 °F (36.2 °C) (02/02/20 1100)  Pulse: 80 (02/02/20 1100)  Resp: 18 (02/02/20 1100)  BP: (!) 160/75 (02/02/20 0817)  SpO2: 97 %  (02/02/20 1100) Vital Signs (24h Range):  Temp:  [97.1 °F (36.2 °C)-98.7 °F (37.1 °C)] 97.1 °F (36.2 °C)  Pulse:  [79-96] 80  Resp:  [16-18] 18  SpO2:  [92 %-98 %] 97 %  BP: ()/(54-75) 160/75     Weight: 56.1 kg (123 lb 10.9 oz)  Body mass index is 24.15 kg/m².    Intake/Output Summary (Last 24 hours) at 2/2/2020 1303  Last data filed at 2/1/2020 1700  Gross per 24 hour   Intake 300 ml   Output --   Net 300 ml      Physical Exam   Constitutional: She appears well-developed and well-nourished. No distress.   HENT:   Head: Normocephalic and atraumatic.   Mouth/Throat: Oropharynx is clear and moist.   Eyes: Pupils are equal, round, and reactive to light. EOM are normal.   Neck: Normal range of motion. Neck supple. No JVD present.   Cardiovascular: Normal rate, regular rhythm and intact distal pulses.   Pulmonary/Chest: Effort normal and breath sounds normal. No respiratory distress.   Abdominal: Soft. Bowel sounds are normal. She exhibits no distension.   Musculoskeletal: She exhibits tenderness. She exhibits no edema.   Neurological: She is alert.   Oriented to name    Skin: Skin is warm and dry. Capillary refill takes less than 2 seconds.   Psychiatric:   Pleasantly confused, cooperative        Significant Labs:   BMP:   Recent Labs   Lab 02/01/20 0125 02/01/20  1137   *  --      --    K 3.5  --      --    CO2 24  --    BUN 7*  --    CREATININE 0.8  --    CALCIUM 8.5*  --    MG 0.9* 1.5*     CBC:   Recent Labs   Lab 01/31/20 2001 02/01/20  0125 02/02/20  0521   WBC 13.10* 13.39* 11.24   HGB 11.7* 11.8* 11.8*   HCT 34.5* 35.0* 34.9*    191 197       Significant Imaging: I have reviewed and interpreted all pertinent imaging results/findings within the past 24 hours.      Assessment/Plan:      * NSTEMI (non-ST elevated myocardial infarction)  Medical management   Aspirin, statin and plavix  ECHo pending  No ischemic intervention planned at this time  Cardiac diet  Code status will be  discussed further    + LAD disease  Medical management only         DNR (do not resuscitate) discussion  DNR made with family and Dr. Evgeny Pryor signed       Hypomagnesemia  MgSO4 IV   Repeat Mg      TIA (transient ischemic attack)  Medical therapy   PT/OT consulted for dc planning       Abnormal CT of the abdomen  Colitis - advance diet as tolerated  IV abx     Esophageal dysmotility  Mechanical soft diet  Reassess by Speech therapy when available   PPI      Essential hypertension  Controlled  Resume lisinopril and metoprolol        Diabetes mellitus, type 2    No documented A1c  On   Oral meds at home - hold while here  SSI   Diabetic diet       Acute colitis  Per CT  IV abx  Advance diet as tolerated  Supportive care         VTE Risk Mitigation (From admission, onward)         Ordered     Place sequential compression device  Until discontinued      01/31/20 1926     IP VTE HIGH RISK PATIENT  Once      01/31/20 1926                      Lety Rosario MD  Department of Hospital Medicine   [facility]

## 2020-02-02 NOTE — CONSULTS
[facility]  Neurology  Consult Note    Patient Name: Sandy Uriarte  MRN: [id number]  Admission Date: 1/31/2020  Hospital Length of Stay: 2 days  Code Status: DNR   Attending Provider: Lety Rosario MD   Consulting Provider: Ari Palomino MD  Primary Care Physician: Rosendo Lawrence MD  Principal Problem:NSTEMI (non-ST elevated myocardial infarction)    Inpatient consult to Neurology  Consult performed by: Ari Palomino MD  Consult ordered by: Rupert John MD        Subjective:     Chief Complaint/Reason for consult: TIA's     HPI: 86 y/o female with medical Hx as listed below was taken to ED after family reports nausea and vomiting for several days as well as abdominal pain. Pt found to have thickening transverse, descending, sigmoid colon suggestive of colitis. She was also found to have elevated troponin with changes in EKG. Apparently pt has been experiencing episode of left sided weakness with facial droop while at another facility.     Ms. Uriarte's family sates that for perhaps a year they have noticed that pt is having trouble recalling recent events. She wanders around the house, hallucinates with people, children, animals. Also at times she becomes restless and slightly combative. Pt has lost around forty pounds given her poor appetite.    Past Medical History:   Diagnosis Date    Cancer     breast cancer    Diabetes mellitus     Hypertension     Neuropathy     Other and unspecified hyperlipidemia     Thyroid disease        Past Surgical History:   Procedure Laterality Date    BACK SURGERY      CHOLECYSTECTOMY      HYSTERECTOMY      KNEE SURGERY      MASTECTOMY      right    TONSILLECTOMY         Review of patient's allergies indicates:  No Known Allergies    Current Neurological Medications:     No current facility-administered medications on file prior to encounter.      Current Outpatient Medications on File Prior to Encounter   Medication Sig    buPROPion  (WELLBUTRIN XL) 150 MG TB24 tablet 1 daily for mood    cyanocobalamin 1,000 mcg/mL injection Inject 1 mL every 2 weeks by intramuscular route as directed.    diphenoxylate-atropine 2.5-0.025 mg (LOMOTIL) 2.5-0.025 mg per tablet TAKE ONE TABLET BY MOUTH FOUR TIMES DAILY AS NEEDED LOOSE BOWELS    glimepiride (AMARYL) 2 MG tablet Take 1 tablet(s) every day by oral route.    metformin (GLUCOPHAGE) 500 MG tablet take one-half tablet by mouth 4 times daily for diabetes    metoprolol succinate (TOPROL-XL) 50 MG 24 hr tablet take one and one-half tablet by mouth daily for high blood pressure    oxybutynin (DITROPAN) 5 MG Tab Take 5 mg by mouth 2 (two) times daily.    simvastatin (ZOCOR) 20 MG tablet one tablet by mouth daily    sulfamethoxazole-trimethoprim 800-160mg (BACTRIM DS) 800-160 mg Tab Take 0.5 tablets by mouth once daily.       Family History     None        Tobacco Use    Smoking status: Never Smoker    Smokeless tobacco: Never Used   Substance and Sexual Activity    Alcohol use: Yes    Drug use: Not on file    Sexual activity: Not on file     Review of Systems   Constitutional: Negative for fever.   HENT: Negative for trouble swallowing.    Eyes: Negative for photophobia.   Respiratory: Negative for chest tightness.    Cardiovascular: Negative for chest pain.   Gastrointestinal: Negative for abdominal pain.   Genitourinary: Negative for dysuria.   Musculoskeletal: Negative for back pain.   Neurological: Negative for headaches.   Psychiatric/Behavioral: Negative for confusion.     Objective:     Vital Signs (Most Recent):  Temp: 98.7 °F (37.1 °C) (02/02/20 0817)  Pulse: 96 (02/02/20 0817)  Resp: 16 (02/02/20 0817)  BP: (!) 160/75 (02/02/20 0817)  SpO2: 98 % (02/02/20 0817) Vital Signs (24h Range):  Temp:  [97.3 °F (36.3 °C)-98.7 °F (37.1 °C)] 98.7 °F (37.1 °C)  Pulse:  [79-96] 96  Resp:  [16-18] 16  SpO2:  [92 %-98 %] 98 %  BP: ()/(54-75) 160/75     Weight: 56.1 kg (123 lb 10.9 oz)  Body mass  index is 24.15 kg/m².    Physical Exam   Constitutional: No distress.   HENT:   Head: Normocephalic.   Eyes: Right eye exhibits no discharge. Left eye exhibits no discharge.   Neck: Neck supple.   Cardiovascular: Regular rhythm.   Pulmonary/Chest: Breath sounds normal.   Abdominal: Bowel sounds are normal.   Musculoskeletal: She exhibits no edema.   Neurological: She has normal strength. She has a normal Finger-Nose-Finger Test.   Skin: She is not diaphoretic.   Psychiatric: Her speech is normal.       NEUROLOGICAL EXAMINATION:     MENTAL STATUS   Speech: speech is normal   Level of consciousness: alert       Oriented person; recognizes some member of the family.     CRANIAL NERVES     CN III, IV, VI   Right pupil: Size: 2 mm. Shape: regular.   Left pupil: Size: 2 mm. Shape: regular.   Nystagmus: none   Ophthalmoparesis: none  Conjugate gaze: present    CN V   Right facial sensation deficit: none  Left facial sensation deficit: none    CN VII   Right facial weakness: none  Left facial weakness: none    CN IX, X   Palate: symmetric    CN XII   Tongue deviation: none    MOTOR EXAM     Strength   Strength 5/5 throughout.     GAIT AND COORDINATION      Coordination   Finger to nose coordination: normal      Significant Labs:   CBC:   Recent Labs   Lab 01/31/20 2001 02/01/20 0125 02/02/20  0521   WBC 13.10* 13.39* 11.24   HGB 11.7* 11.8* 11.8*   HCT 34.5* 35.0* 34.9*    191 197     CMP:   Recent Labs   Lab 01/31/20 2001 02/01/20 0125 02/01/20  1137   * 164*  --     137  --    K 3.7 3.5  --     102  --    CO2 21* 24  --    BUN 8 7*  --    CREATININE 0.8 0.8  --    CALCIUM 8.4* 8.5*  --    MG  --  0.9* 1.5*   PROT  --  5.8*  --    ALBUMIN  --  2.8*  --    BILITOT  --  0.2  --    ALKPHOS  --  50*  --    AST  --  20  --    ALT  --  14  --    ANIONGAP 13 11  --    EGFRNONAA >60 >60  --      LIPIDS/LFTS:  Recent Labs   Lab 01/31/20 2001   Cholesterol 77 L   Triglycerides 139   HDL 32 L   LDL  Cholesterol 17.2 L   Non-HDL Cholesterol 45         Significant Imaging:   MRI brain  FINDINGS:  Multiple sequences are degraded by motion artifact.  No restricted diffusion.  Midline structures are intact.  Chronic microvascular ischemic changes are again noted and appear slightly progressed since the most recent comparison exam.  No blooming artifact.  No acute bleed.  No extra-axial collection.  Atrophy of the brain parenchyma noted.  Visualized intracranial vessels remain grossly patent.  No abnormal areas of enhancement..  Right maxillary sinusitis changes.  Small right mastoid effusion.  Trace fluid at the left mastoid tip.      Impression       Chronic microvascular ischemic changes, slightly progressed since 01/05/2018.  Otherwise stable exam.  No acute abnormality.      Electronically signed by: Rehan Arriaga MD  Date: 02/01/2020  Time: 09:36         Assessment and Plan:     86 y/o female consulted for TIA    1. TIA's: possible due to transient symptoms.    -Antiplatelets: ASA and clopidogrel.    -No further work up to be sought at this time.       2. Cognitive impairment: pt with dementia likely. He short-term memory difficulties, change in behavior, hallucinations are suggestive of vascular dementia.   MRI brain shows moderate microvascular ischemic changes.   -Pt is DNR now.     Findings and Dx discussed with family extensively.   -    Active Diagnoses:    Diagnosis Date Noted POA    PRINCIPAL PROBLEM:  NSTEMI (non-ST elevated myocardial infarction) [I21.4] 01/31/2020 Yes    DNR (do not resuscitate) discussion [Z71.89] 02/02/2020 Not Applicable    TIA (transient ischemic attack) [G45.9] 02/01/2020 Yes    Hypomagnesemia [E83.42] 02/01/2020 Yes    Acute colitis [K52.9] 01/31/2020 Yes    Diabetes mellitus, type 2 [E11.9] 01/31/2020 Yes    Essential hypertension [I10] 01/31/2020 Yes    Thyroid dysfunction [E07.9] 01/31/2020 Yes    Esophageal dysmotility [K22.4] 01/31/2020 Yes    Abnormal CT of the  abdomen [R93.5] 01/31/2020 Yes      Problems Resolved During this Admission:       VTE Risk Mitigation (From admission, onward)         Ordered     Place sequential compression device  Until discontinued      01/31/20 1926     IP VTE HIGH RISK PATIENT  Once      01/31/20 1926                Thank you for your consult. I will follow-up with patient. Please contact us if you have any additional questions.    Ari Palomino MD  Neurology  [facility]

## 2020-02-02 NOTE — ASSESSMENT & PLAN NOTE
Apparent hx of CP during esophagram with new ant TWI/loss of forces on EKG with elev trop c/w NSTEMI.  On my interview today, she also reports an episode of prolonged CP 2 months ago.  Echo c/w LAD MI, ?acuity.  EF 40%.  Pt currently pain free.  Significant progressive dementia noted.  Still AAO x zero.  At present, she is a poor revasc candidate and I have recommended med rx to family.  Cont ASA/statin/Plavix.    Discussed advanced directives at length with mult family members and everybody in agreement re: DNR.  Case d/w Aquiles Rosario and Candelario.

## 2020-02-02 NOTE — PLAN OF CARE
Problem: Adult Inpatient Plan of Care  Goal: Plan of Care Review  Outcome: Ongoing, Progressing  Flowsheets (Taken 2/2/2020 0102)  Plan of Care Reviewed With: patient;daughter;family  Goal: Patient-Specific Goal (Individualization)  Outcome: Ongoing, Progressing  Goal: Absence of Hospital-Acquired Illness or Injury  Outcome: Ongoing, Progressing  Intervention: Identify and Manage Fall Risk  Flowsheets (Taken 2/2/2020 0102)  Safety Promotion/Fall Prevention: assistive device/personal item within reach;side rails raised x 2;supervised activity;Fall Risk reviewed with patient/family  Intervention: Prevent VTE (venous thromboembolism)  Flowsheets (Taken 2/2/2020 0102)  VTE Prevention/Management: fluids promoted  Goal: Optimal Comfort and Wellbeing  Outcome: Ongoing, Progressing  Goal: Readiness for Transition of Care  Outcome: Ongoing, Progressing  Goal: Rounds/Family Conference  Outcome: Ongoing, Progressing     Problem: Diabetes Comorbidity  Goal: Blood Glucose Level Within Desired Range  Outcome: Ongoing, Progressing  Intervention: Maintain Glycemic Control  Flowsheets (Taken 2/2/2020 0102)  Glycemic Management: blood glucose monitoring     Problem: Wound  Goal: Optimal Wound Healing  Outcome: Ongoing, Progressing     Problem: Skin Injury Risk Increased  Goal: Skin Health and Integrity  Outcome: Ongoing, Progressing     Problem: Fall Injury Risk  Goal: Absence of Fall and Fall-Related Injury  Outcome: Ongoing, Progressing  Intervention: Identify and Manage Contributors to Fall Injury Risk  Flowsheets (Taken 2/2/2020 0102)  Medication Review/Management: medications reviewed  Intervention: Promote Injury-Free Environment  Flowsheets (Taken 2/2/2020 0102)  Safety Promotion/Fall Prevention: assistive device/personal item within reach;side rails raised x 2;supervised activity;Fall Risk reviewed with patient/family     Problem: Infection  Goal: Infection Symptom Resolution  Outcome: Ongoing, Progressing   Patient in bed  resting, family is at beside, no complaints at this time. Side rails up 2x, bed locked in lowest position. Will continue to monitor.  2/2/2020 Gary Friedman RN

## 2020-02-02 NOTE — PROGRESS NOTES
Patient will be discharging with [facility] services and currently has [facility] ; contacted Zoe with [facility] who reported they do have AIM services.

## 2020-02-02 NOTE — SUBJECTIVE & OBJECTIVE
Interval History: pt eating lunch, no c/o abdominal pain, denies CP    Review of Systems   Unable to perform ROS: Dementia   Endocrine: Negative for cold intolerance.     Objective:     Vital Signs (Most Recent):  Temp: 97.1 °F (36.2 °C) (02/02/20 1100)  Pulse: 80 (02/02/20 1100)  Resp: 18 (02/02/20 1100)  BP: (!) 160/75 (02/02/20 0817)  SpO2: 97 % (02/02/20 1100) Vital Signs (24h Range):  Temp:  [97.1 °F (36.2 °C)-98.7 °F (37.1 °C)] 97.1 °F (36.2 °C)  Pulse:  [79-96] 80  Resp:  [16-18] 18  SpO2:  [92 %-98 %] 97 %  BP: ()/(54-75) 160/75     Weight: 56.1 kg (123 lb 10.9 oz)  Body mass index is 24.15 kg/m².    Intake/Output Summary (Last 24 hours) at 2/2/2020 1303  Last data filed at 2/1/2020 1700  Gross per 24 hour   Intake 300 ml   Output --   Net 300 ml      Physical Exam   Constitutional: She appears well-developed and well-nourished. No distress.   HENT:   Head: Normocephalic and atraumatic.   Mouth/Throat: Oropharynx is clear and moist.   Eyes: Pupils are equal, round, and reactive to light. EOM are normal.   Neck: Normal range of motion. Neck supple. No JVD present.   Cardiovascular: Normal rate, regular rhythm and intact distal pulses.   Pulmonary/Chest: Effort normal and breath sounds normal. No respiratory distress.   Abdominal: Soft. Bowel sounds are normal. She exhibits no distension.   Musculoskeletal: She exhibits tenderness. She exhibits no edema.   Neurological: She is alert.   Oriented to name    Skin: Skin is warm and dry. Capillary refill takes less than 2 seconds.   Psychiatric:   Pleasantly confused, cooperative        Significant Labs:   BMP:   Recent Labs   Lab 02/01/20  0125 02/01/20  1137   *  --      --    K 3.5  --      --    CO2 24  --    BUN 7*  --    CREATININE 0.8  --    CALCIUM 8.5*  --    MG 0.9* 1.5*     CBC:   Recent Labs   Lab 01/31/20 2001 02/01/20  0125 02/02/20  0521   WBC 13.10* 13.39* 11.24   HGB 11.7* 11.8* 11.8*   HCT 34.5* 35.0* 34.9*    191  197       Significant Imaging: I have reviewed and interpreted all pertinent imaging results/findings within the past 24 hours.

## 2020-02-02 NOTE — NURSING
End of shift bedside report given to GENIE Negro. Patient in no apparent distress.     12 hour chart check complete.

## 2020-02-02 NOTE — ASSESSMENT & PLAN NOTE
Medical management   Aspirin, statin and plavix  ECHo pending  No ischemic intervention planned at this time  Cardiac diet  Code status will be discussed further    + LAD disease  Medical management only

## 2020-02-02 NOTE — ASSESSMENT & PLAN NOTE
Discussed advanced directives at length with mult family members and everybody in agreement re: DNR.    Case d/w Aquiles Rosario and Candelario.

## 2020-02-02 NOTE — SUBJECTIVE & OBJECTIVE
Past Medical History:   Diagnosis Date    Cancer     breast cancer    Diabetes mellitus     Hypertension     Neuropathy     Other and unspecified hyperlipidemia     Thyroid disease        Past Surgical History:   Procedure Laterality Date    BACK SURGERY      CHOLECYSTECTOMY      HYSTERECTOMY      KNEE SURGERY      MASTECTOMY      right    TONSILLECTOMY         Review of patient's allergies indicates:  No Known Allergies    No current facility-administered medications on file prior to encounter.      Current Outpatient Medications on File Prior to Encounter   Medication Sig    buPROPion (WELLBUTRIN XL) 150 MG TB24 tablet 1 daily for mood    cyanocobalamin 1,000 mcg/mL injection Inject 1 mL every 2 weeks by intramuscular route as directed.    diphenoxylate-atropine 2.5-0.025 mg (LOMOTIL) 2.5-0.025 mg per tablet TAKE ONE TABLET BY MOUTH FOUR TIMES DAILY AS NEEDED LOOSE BOWELS    glimepiride (AMARYL) 2 MG tablet Take 1 tablet(s) every day by oral route.    metformin (GLUCOPHAGE) 500 MG tablet take one-half tablet by mouth 4 times daily for diabetes    metoprolol succinate (TOPROL-XL) 50 MG 24 hr tablet take one and one-half tablet by mouth daily for high blood pressure    oxybutynin (DITROPAN) 5 MG Tab Take 5 mg by mouth 2 (two) times daily.    simvastatin (ZOCOR) 20 MG tablet one tablet by mouth daily    sulfamethoxazole-trimethoprim 800-160mg (BACTRIM DS) 800-160 mg Tab Take 0.5 tablets by mouth once daily.      Family History     None        Tobacco Use    Smoking status: Never Smoker    Smokeless tobacco: Never Used   Substance and Sexual Activity    Alcohol use: Yes    Drug use: Not on file    Sexual activity: Not on file     Review of Systems   Unable to perform ROS: dementia     Objective:     Vital Signs (Most Recent):  Temp: 98.7 °F (37.1 °C) (02/02/20 0817)  Pulse: 96 (02/02/20 0817)  Resp: 16 (02/02/20 0817)  BP: (!) 160/75 (02/02/20 0817)  SpO2: 98 % (02/02/20 0817) Vital Signs  (24h Range):  Temp:  [97.3 °F (36.3 °C)-98.7 °F (37.1 °C)] 98.7 °F (37.1 °C)  Pulse:  [79-96] 96  Resp:  [16-18] 16  SpO2:  [92 %-98 %] 98 %  BP: ()/(54-75) 160/75     Weight: 56.1 kg (123 lb 10.9 oz)  Body mass index is 24.15 kg/m².    SpO2: 98 %  O2 Device (Oxygen Therapy): room air      Intake/Output Summary (Last 24 hours) at 2/2/2020 0939  Last data filed at 2/1/2020 1700  Gross per 24 hour   Intake 300 ml   Output --   Net 300 ml       Lines/Drains/Airways     Peripheral Intravenous Line                 Midline Catheter Insertion/Assessment  - Single Lumen 02/01/20 2120 Left basilic vein (medial side of arm) other (see comments) less than 1 day              Exam unchanged vs 2/1/20  Physical Exam   Constitutional: She appears well-developed and well-nourished. No distress.   HENT:   Head: Normocephalic and atraumatic.   Mouth/Throat: No oropharyngeal exudate.   Eyes: Pupils are equal, round, and reactive to light. Conjunctivae and EOM are normal. No scleral icterus.   Neck: Normal range of motion. Neck supple. No JVD present. No tracheal deviation present. No thyromegaly present.   Cardiovascular: Normal rate, regular rhythm, S1 normal and S2 normal. Exam reveals no gallop and no friction rub.   No murmur heard.  Pulmonary/Chest: Effort normal and breath sounds normal. No respiratory distress. She has no wheezes. She has no rales. She exhibits no tenderness.   Abdominal: Soft. Bowel sounds are normal. There is no tenderness.   Musculoskeletal: Normal range of motion. She exhibits no edema.   Neurological: She is alert. No cranial nerve deficit.   Oriented x0   Skin: Skin is warm and dry. She is not diaphoretic.   Psychiatric:   UTO       Current Medications:   aspirin  81 mg Oral Daily    buPROPion  150 mg Oral Daily    cefTRIAXone (ROCEPHIN) IVPB  1 g Intravenous Q24H    clopidogreL  75 mg Oral Daily    lisinopril  10 mg Oral Daily    metoprolol succinate  50 mg Oral Daily    metroNIDAZOLE  500  mg Oral Q8H    oxybutynin  5 mg Oral BID    polyethylene glycol  17 g Oral Daily    simvastatin  20 mg Oral QHS       acetaminophen, dextrose 50%, dextrose 50%, glucagon (human recombinant), glucose, glucose, influenza, insulin aspart U-100, morphine, nitroGLYCERIN, ondansetron, oxyCODONE-acetaminophen, oxyCODONE-acetaminophen, pneumoc 13-tristan conj-dip cr(PF), sodium chloride 0.9%    Laboratory (all labs reviewed):  CBC:  Recent Labs   Lab 01/31/20 2001 02/01/20 0125 02/02/20  0521   WBC 13.10 H 13.39 H 11.24   Hemoglobin 11.7 L 11.8 L 11.8 L   Hematocrit 34.5 L 35.0 L 34.9 L   Platelets 195 191 197       CHEMISTRIES:  Recent Labs   Lab 04/11/17  1511 01/31/20 2001 02/01/20 0125 02/01/20  1137   Glucose  --  136 H 164 H  --    Sodium  --  136 137  --    Potassium  --  3.7 3.5  --    BUN, Bld  --  8 7 L  --    Creatinine 1.0 0.8 0.8  --    eGFR if  >60 >60 >60  --    eGFR if non  53 A >60 >60  --    Calcium  --  8.4 L 8.5 L  --    Magnesium  --   --  0.9 LL 1.5 L       CARDIAC BIOMARKERS:  Recent Labs   Lab 01/31/20 2001 02/01/20 0125 02/01/20  0810   Troponin I 0.134 H  0.134 H 0.118 H 0.110 H       COAGS:        LIPIDS/LFTS:  Recent Labs   Lab 01/31/20 2001 02/01/20  0125   Cholesterol 77 L  --    Triglycerides 139  --    HDL 32 L  --    LDL Cholesterol 17.2 L  --    Non-HDL Cholesterol 45  --    AST  --  20   ALT  --  14       BNP:  Recent Labs   Lab 01/31/20 2001    H       TSH:  Recent Labs   Lab 01/31/20 2001   TSH 0.912       Free T4:  Recent Labs   Lab 01/31/20 2001   Free T4 1.22       Diagnostic Results:  ECG (personally reviewed and interpreted tracing(s)):  1/28/20 2333 ([facility]): SR 89, low volt  1/29/20 1843 ([facility]): , ant TWI, PRWP, c/r/o AWMI    Echo: 2/1/20 (images personally reviewed and interpreted)  Mildly decreased left ventricular systolic function. The estimated ejection fraction is 40%.  Akinesis of mid-apical anterior/septal walls c/w LAD  territory MI.  Grade I (mild) left ventricular diastolic dysfunction consistent with impaired relaxation.  Normal right ventricular systolic function.  The estimated PA systolic pressure is 29 mmHg.  Small posterior pericardial effusion without hemodynamic compromise.    Carotid US 2/1/20  Marginally elevated velocities with increased ICA to CCA ratio in the distal left ICA suggesting stenosis on the lower end of the 50-69% range.  However, this is not corroborated by grayscale findings.  Findings therefore may relate to tortuosity of the vessel.  Consider attention on follow-up as indicated.

## 2020-02-02 NOTE — NURSING
14:00 Patient's IV went bad, Charge Nurse Filemon notified that I didn't see any veins to stick, patient is limited to the left arm only. Roland notified that IV was needed.     1545: Roland placed a 22G to left wrist.     1625: Went in to hang antibiotics to patients new IV and IV was not able to be flushed.     Dr. Rosario notified and midline ordered, Roland notified of midline order in place.

## 2020-02-03 VITALS
SYSTOLIC BLOOD PRESSURE: 114 MMHG | HEIGHT: 60 IN | TEMPERATURE: 99 F | DIASTOLIC BLOOD PRESSURE: 60 MMHG | OXYGEN SATURATION: 98 % | WEIGHT: 123.69 LBS | BODY MASS INDEX: 24.28 KG/M2 | HEART RATE: 99 BPM | RESPIRATION RATE: 18 BRPM

## 2020-02-03 LAB
ANION GAP SERPL CALC-SCNC: 10 MMOL/L (ref 8–16)
BUN SERPL-MCNC: 7 MG/DL (ref 8–23)
CALCIUM SERPL-MCNC: 8.9 MG/DL (ref 8.7–10.5)
CHLORIDE SERPL-SCNC: 103 MMOL/L (ref 95–110)
CO2 SERPL-SCNC: 25 MMOL/L (ref 23–29)
CREAT SERPL-MCNC: 0.7 MG/DL (ref 0.5–1.4)
EST. GFR  (AFRICAN AMERICAN): >60 ML/MIN/1.73 M^2
EST. GFR  (NON AFRICAN AMERICAN): >60 ML/MIN/1.73 M^2
GLUCOSE SERPL-MCNC: 206 MG/DL (ref 70–110)
MAGNESIUM SERPL-MCNC: 1.3 MG/DL (ref 1.6–2.6)
POCT GLUCOSE: 191 MG/DL (ref 70–110)
POCT GLUCOSE: 213 MG/DL (ref 70–110)
POCT GLUCOSE: 251 MG/DL (ref 70–110)
POTASSIUM SERPL-SCNC: 3.2 MMOL/L (ref 3.5–5.1)
RPR SER QL: NORMAL
SODIUM SERPL-SCNC: 138 MMOL/L (ref 136–145)

## 2020-02-03 PROCEDURE — 97161 PT EVAL LOW COMPLEX 20 MIN: CPT

## 2020-02-03 PROCEDURE — 25000003 PHARM REV CODE 250: Performed by: INTERNAL MEDICINE

## 2020-02-03 PROCEDURE — 80048 BASIC METABOLIC PNL TOTAL CA: CPT

## 2020-02-03 PROCEDURE — 90471 IMMUNIZATION ADMIN: CPT | Performed by: HOSPITALIST

## 2020-02-03 PROCEDURE — 63600175 PHARM REV CODE 636 W HCPCS: Performed by: HOSPITALIST

## 2020-02-03 PROCEDURE — 97165 OT EVAL LOW COMPLEX 30 MIN: CPT

## 2020-02-03 PROCEDURE — 83735 ASSAY OF MAGNESIUM: CPT

## 2020-02-03 PROCEDURE — G0009 ADMIN PNEUMOCOCCAL VACCINE: HCPCS | Performed by: HOSPITALIST

## 2020-02-03 PROCEDURE — 90670 PCV13 VACCINE IM: CPT | Performed by: HOSPITALIST

## 2020-02-03 PROCEDURE — 25000003 PHARM REV CODE 250: Performed by: HOSPITALIST

## 2020-02-03 PROCEDURE — 36415 COLL VENOUS BLD VENIPUNCTURE: CPT

## 2020-02-03 RX ORDER — TALC
1 POWDER (GRAM) TOPICAL ONCE
Status: COMPLETED | OUTPATIENT
Start: 2020-02-03 | End: 2020-02-03

## 2020-02-03 RX ORDER — TALC
9 POWDER (GRAM) TOPICAL NIGHTLY PRN
Refills: 0 | COMMUNITY
Start: 2020-02-03

## 2020-02-03 RX ORDER — LEVOTHYROXINE SODIUM 50 UG/1
50 TABLET ORAL
Qty: 30 TABLET | Refills: 11 | Status: SHIPPED | OUTPATIENT
Start: 2020-02-04 | End: 2021-02-03

## 2020-02-03 RX ORDER — LISINOPRIL 10 MG/1
10 TABLET ORAL DAILY
Qty: 90 TABLET | Refills: 3 | Status: SHIPPED | OUTPATIENT
Start: 2020-02-04 | End: 2021-02-03

## 2020-02-03 RX ORDER — CLOPIDOGREL BISULFATE 75 MG/1
75 TABLET ORAL DAILY
Qty: 30 TABLET | Refills: 11 | Status: SHIPPED | OUTPATIENT
Start: 2020-02-04 | End: 2021-02-03

## 2020-02-03 RX ORDER — ASPIRIN 81 MG/1
81 TABLET ORAL DAILY
Qty: 30 TABLET | Refills: 11
Start: 2020-02-04 | End: 2021-02-03

## 2020-02-03 RX ORDER — POTASSIUM CHLORIDE 20 MEQ/15ML
40 SOLUTION ORAL ONCE
Status: COMPLETED | OUTPATIENT
Start: 2020-02-03 | End: 2020-02-03

## 2020-02-03 RX ORDER — CIPROFLOXACIN 500 MG/1
500 TABLET ORAL EVERY 12 HOURS
Qty: 14 TABLET | Refills: 0 | Status: ON HOLD | OUTPATIENT
Start: 2020-02-03 | End: 2020-02-13 | Stop reason: HOSPADM

## 2020-02-03 RX ORDER — METRONIDAZOLE 500 MG/1
500 TABLET ORAL EVERY 8 HOURS
Qty: 21 TABLET | Refills: 0 | Status: ON HOLD | OUTPATIENT
Start: 2020-02-03 | End: 2020-02-13 | Stop reason: HOSPADM

## 2020-02-03 RX ADMIN — Medication 250 MG: at 11:02

## 2020-02-03 RX ADMIN — BUPROPION HYDROCHLORIDE 150 MG: 150 TABLET, FILM COATED, EXTENDED RELEASE ORAL at 11:02

## 2020-02-03 RX ADMIN — METRONIDAZOLE 500 MG: 250 TABLET ORAL at 02:02

## 2020-02-03 RX ADMIN — INSULIN ASPART 6 UNITS: 100 INJECTION, SOLUTION INTRAVENOUS; SUBCUTANEOUS at 09:02

## 2020-02-03 RX ADMIN — CEFTRIAXONE 1 G: 1 INJECTION, SOLUTION INTRAVENOUS at 11:02

## 2020-02-03 RX ADMIN — INSULIN ASPART 4 UNITS: 100 INJECTION, SOLUTION INTRAVENOUS; SUBCUTANEOUS at 11:02

## 2020-02-03 RX ADMIN — CLOPIDOGREL BISULFATE 75 MG: 75 TABLET ORAL at 11:02

## 2020-02-03 RX ADMIN — ACETAMINOPHEN 650 MG: 325 TABLET ORAL at 06:02

## 2020-02-03 RX ADMIN — OXYBUTYNIN CHLORIDE 5 MG: 5 TABLET ORAL at 11:02

## 2020-02-03 RX ADMIN — PNEUMOCOCCAL 13-VALENT CONJUGATE VACCINE 0.5 ML: 2.2; 2.2; 2.2; 2.2; 2.2; 4.4; 2.2; 2.2; 2.2; 2.2; 2.2; 2.2; 2.2 INJECTION, SUSPENSION INTRAMUSCULAR at 04:02

## 2020-02-03 RX ADMIN — POTASSIUM CHLORIDE 40 MEQ: 20 SOLUTION ORAL at 11:02

## 2020-02-03 RX ADMIN — ACETAMINOPHEN 650 MG: 325 TABLET ORAL at 02:02

## 2020-02-03 RX ADMIN — METRONIDAZOLE 500 MG: 250 TABLET ORAL at 06:02

## 2020-02-03 RX ADMIN — ASPIRIN 81 MG: 81 TABLET, COATED ORAL at 11:02

## 2020-02-03 RX ADMIN — LEVOTHYROXINE SODIUM 50 MCG: 50 TABLET ORAL at 06:02

## 2020-02-03 NOTE — PLAN OF CARE
Problem: Diabetes Comorbidity  Goal: Blood Glucose Level Within Desired Range  Outcome: Ongoing, Progressing  Intervention: Maintain Glycemic Control  Flowsheets (Taken 2/2/2020 1807)  Glycemic Management: blood glucose monitoring; supplemental insulin given

## 2020-02-03 NOTE — NURSING
End of shift bedside report given to GENIE Ruano. Patient in no apparent distress.     12 hour chart check complete.

## 2020-02-03 NOTE — PROGRESS NOTES
[facility] WRITTEN HEALTHCARE AND DISCHARGE INFORMATION..  Follow-up Information     Schedule an appointment as soon as possible for a visit with Rosendo Lawrence MD.    Specialty:  Family Medicine  Contact information:  [address]  [facility]  [place] [address]  [phone]             [facility] [place] On 2/4/2020.    Specialty:  Home Health Services  Why:  Home Health  Contact information:  [address]  [place] [address]  [phone]             [facility] On 2/4/2020.    Specialty:  DME Provider  Why:  DME: hospital  bed and ursula lift to be delivered to home  Contact information:  [address] [facility] [place] [address]  [phone]                   [facility]           [phone]  After discharge for assistance [facility] On Call Nurse Care Line 24/7 Assistance.   Things You are responsible For To Manage Your Care At Home:  1.    Getting your prescriptions filled   2.    Taking your medications as directed, DO NOT MISS ANY DOSES!  3.    Going to your follow-up doctor appointment. This is important because it  allow the doctor to monitor your progress and determine if  any changes need to made to your treatment plan.   Thank you for choosing [facility] for your care. [facility] happy to have the opportunity to serve you.    Sincerely,  Your [facility] Team,  Qing Adams RN, BSN, [facility]  [phone]  2/3/2020

## 2020-02-03 NOTE — NURSING
Bedside shift report received from GENIE Garcia. Communication board has been updated. NAD noted. Will continue to monitor. Pt is asleep and daughter at the bedside.

## 2020-02-03 NOTE — PLAN OF CARE
Problem: Physical Therapy Goal  Goal: Physical Therapy Goal  Description  Goals to be met by: 2020    Patient will increase functional independence with mobility by performin. Sit<>stand with min  with RW.  2. Gait x 10 feet with RW with min A.  3. Pt to transfer EOB to BSC with min A     Outcome: Ongoing, Progressing   Pt presented supine in bed, agree able to PT, bed mobility roll L with mod I, up to sit with CGA, pt sat EOB x 3-4 min, no S/S of dizziness.  Sit>stand with mod A, pt with posterior lean, attempted to take step, but unable due to poor balance.  Pt return to sit EOB with CGA, to supine with SBA.

## 2020-02-03 NOTE — PLAN OF CARE
Problem: Occupational Therapy Goal  Goal: Occupational Therapy Goal  Outcome: Met    Patient tolerated evaluation well, patient with no need for skilled OT services at this time. FLAVIO Raines, MS

## 2020-02-03 NOTE — PLAN OF CARE
[facility]    HOME HEALTH ORDERS  FACE TO FACE ENCOUNTER    Patient Name: Sandy Uriarte  YOB: 1934    PCP: Rosendo Lawrence MD   PCP Address: [address] [place] [address] / [address] [place] [address]  PCP Phone Number: [phone]  PCP Fax: [phone]    Encounter Date: 02/03/2020    Admit to Home Health with [facility]    Diagnoses:  Active Hospital Problems    Diagnosis  POA    *NSTEMI (non-ST elevated myocardial infarction) [I21.4]  Yes    DNR (do not resuscitate) discussion [Z71.89]  Not Applicable    TIA (transient ischemic attack) [G45.9]  Yes    Hypomagnesemia [E83.42]  Yes    Acute colitis [K52.9]  Yes    Diabetes mellitus, type 2 [E11.9]  Yes    Essential hypertension [I10]  Yes    Thyroid dysfunction [E07.9]  Yes    Esophageal dysmotility [K22.4]  Yes    Abnormal CT of the abdomen [R93.5]  Yes      Resolved Hospital Problems   No resolved problems to display.       No future appointments.  Follow-up Information     Rosendo Lawrence MD.    Specialty:  Family Medicine  Contact information:  [address] [facility]  [place] [address]  [phone]                     I have seen and examined this patient face to face today. My clinical findings that support the need for the home health skilled services and home bound status are the following:  Weakness/numbness causing balance and gait disturbance due to Infection and Weakness/Debility making it taxing to leave home.    Allergies:Review of patient's allergies indicates:  No Known Allergies    Diet: cardiac diet and diabetic diet: 1800 calorie    Activities: activity as tolerated    Nursing:   SN to complete comprehensive assessment including routine vital signs. Instruct on disease process and s/s of complications to report to MD. Review/verify medication list sent home with the patient at time of discharge  and instruct patient/caregiver as needed. Frequency may be adjusted depending on start of  care date.    Notify MD if SBP > 160 or < 90; DBP > 90 or < 50; HR > 120 or < 50; Temp > 101; Other:         CONSULTS:    Physical Therapy to evaluate and treat. Evaluate for home safety and equipment needs; Establish/upgrade home exercise program. Perform / instruct on therapeutic exercises, gait training, transfer training, and Range of Motion.  Occupational Therapy to evaluate and treat. Evaluate home environment for safety and equipment needs. Perform/Instruct on transfers, ADL training, ROM, and therapeutic exercises.  Aide to provide assistance with personal care, ADLs, and vital signs.    MISCELLANEOUS CARE:  Routine Skin for Bedridden Patients: Instruct patient/caregiver to apply moisture barrier cream to all skin folds and wet areas in perineal area daily and after baths and all bowel movements.  Diabetic Care:   SN to perform and educate Diabetic management with blood glucose monitoring:, Fingerstick blood sugar AC and HS and Report CBG < 60 or > 350 to physician.    Medications: Review discharge medications with patient and family and provide education.      Current Discharge Medication List      START taking these medications    Details   aspirin (ECOTRIN) 81 MG EC tablet Take 1 tablet (81 mg total) by mouth once daily.  Qty: 30 tablet, Refills: 11      ciprofloxacin HCl (CIPRO) 500 MG tablet Take 1 tablet (500 mg total) by mouth every 12 (twelve) hours. for 7 days  Qty: 14 tablet, Refills: 0      clopidogreL (PLAVIX) 75 mg tablet Take 1 tablet (75 mg total) by mouth once daily.  Qty: 30 tablet, Refills: 11      levothyroxine (SYNTHROID) 50 MCG tablet Take 1 tablet (50 mcg total) by mouth before breakfast.  Qty: 30 tablet, Refills: 11      lisinopril 10 MG tablet Take 1 tablet (10 mg total) by mouth once daily.  Qty: 90 tablet, Refills: 3      melatonin (MELATIN) 3 mg tablet Take 3 tablets (9 mg total) by mouth nightly as needed for Insomnia.  Refills: 0      metroNIDAZOLE (FLAGYL) 500 MG tablet Take 1  tablet (500 mg total) by mouth every 8 (eight) hours. for 7 days  Qty: 21 tablet, Refills: 0         CONTINUE these medications which have NOT CHANGED    Details   buPROPion (WELLBUTRIN XL) 150 MG TB24 tablet 1 daily for mood  Refills: 5      glimepiride (AMARYL) 2 MG tablet Take 1 tablet(s) every day by oral route.  Refills: 5      metformin (GLUCOPHAGE) 500 MG tablet take one-half tablet by mouth 4 times daily for diabetes  Refills: 5      metoprolol succinate (TOPROL-XL) 50 MG 24 hr tablet take one and one-half tablet by mouth daily for high blood pressure  Refills: 5      oxybutynin (DITROPAN) 5 MG Tab Take 5 mg by mouth 2 (two) times daily.  Refills: 5      simvastatin (ZOCOR) 20 MG tablet one tablet by mouth daily  Refills: 5         STOP taking these medications       cyanocobalamin 1,000 mcg/mL injection Comments:   Reason for Stopping:         diphenoxylate-atropine 2.5-0.025 mg (LOMOTIL) 2.5-0.025 mg per tablet Comments:   Reason for Stopping:         sulfamethoxazole-trimethoprim 800-160mg (BACTRIM DS) 800-160 mg Tab Comments:   Reason for Stopping:               I certify that this patient is confined to her home and needs intermittent skilled nursing care, physical therapy and occupational therapy.

## 2020-02-03 NOTE — PT/OT/SLP EVAL
Physical Therapy Evaluation    Patient Name:  Sandy Uriarte   MRN:  [id number]    Recommendations:     Discharge Recommendations:  home health PT   Discharge Equipment Recommendations: lift device, hospital bed   Barriers to discharge: None    Assessment:     Sandy Uriarte is a 85 y.o. female admitted with a medical diagnosis of NSTEMI (non-ST elevated myocardial infarction).  She presents with the following impairments/functional limitations:  weakness, gait instability, impaired functional mobilty, impaired balance, decreased lower extremity function, decreased coordination, decreased safety awareness .Pt  with functional mobility deficits and should benefit from  PT  to maximize I and safety decrease risk of further decline of injury.    Rehab Prognosis: Good; patient would benefit from acute skilled PT services to address these deficits and reach maximum level of function.    Recent Surgery: * No surgery found *      Plan:     During this hospitalization, patient to be seen 5 x/week to address the identified rehab impairments via gait training, therapeutic activities, therapeutic exercises, neuromuscular re-education and progress toward the following goals:    · Plan of Care Expires:  03/03/20    Subjective     Chief Complaint: pt without significant c/o  Patient/Family Comments/goals: asking about d/c  Pain/Comfort:  · Pain Rating 1: (pt did not rate)  · Location - Side 1: Left  · Location 1: ankle  · Pain Addressed 1: Reposition, Cessation of Activity  · Pain Rating Post-Intervention 1: 0/10    Patients cultural, spiritual, Religion conflicts given the current situation: no    Living Environment:  Pt has been living at [facility] each month. Family intends on pt returning to her home and family staying with her.  Prior to admission, patients level of function was pt has been bed ridden x 3 weeks following a fall. Prior to fall pt was mod I with RW.  Equipment used at home: wheelchair.  DME owned (not  currently used): rolling walker, wheelchair and rollator, w/c cushion.  Upon discharge, patient will have assistance from daughters/family.    Objective:     Communicated with nurse prior to session.  Patient found supine with peripheral IV  upon PT entry to room.    General Precautions: Standard, fall   Orthopedic Precautions:N/A   Braces: N/A     Exams:  · Cognitive Exam:  Patient is oriented to Person, Place and Situation  · Gross Motor Coordination:  diminished, pt unable to stand without moderate lila max support due to posterior lean  · Skin Integrity/Edema:      · -       Skin integrity: Visible skin intact  · RLE ROM: WFL  · RLE Strength: grossly 4/5  · LLE ROM: WFL  · LLE Strength: grossly 4/5    Functional Mobility:  · Bed Mobility:     · Rolling Left:  modified independence  · Transfers:     · Sit to Stand:  moderate assistance with no AD  · Gait: unable  · Balance: fair + sitting balance, poor standing balance      Therapeutic Activities and Exercises:  Pt presented supine in bed, agree able to PT, bed mobility roll L with mod I, up to sit with CGA, pt sat EOB x 3-4 min, no S/S of dizziness.  Sit>stand with mod A, pt with posterior lean, attempted to take step, but unable due to poor balance.  Pt return to sit EOB with CGA, to supine with SBA.    AM-PAC 6 CLICK MOBILITY  Total Score:12     Patient left supine with all lines intact, call button in reach and dtrs present. Dr Douglass and Gisselle CM notified of outcome.    GOALS:   Multidisciplinary Problems     Physical Therapy Goals        Problem: Physical Therapy Goal    Goal Priority Disciplines Outcome Goal Variances Interventions   Physical Therapy Goal     PT, PT/OT Ongoing, Progressing     Description:  Goals to be met by: 2020    Patient will increase functional independence with mobility by performin. Sit<>stand with min  with RW.  2. Gait x 10 feet with RW with min A.  3. Pt to transfer EOB to [facility] with min A                       History:     Past Medical History:   Diagnosis Date    Cancer     breast cancer    Diabetes mellitus     Hypertension     Neuropathy     Other and unspecified hyperlipidemia     Thyroid disease        Past Surgical History:   Procedure Laterality Date    BACK SURGERY      CHOLECYSTECTOMY      HYSTERECTOMY      KNEE SURGERY      MASTECTOMY      right    TONSILLECTOMY         Time Tracking:     PT Received On: 02/03/20  PT Start Time: 1450     PT Stop Time: 1507  PT Total Time (min): 17 min     Billable Minutes: Evaluation 17      Pih Killian, PT  02/03/2020

## 2020-02-03 NOTE — PT/OT/SLP EVAL
"Occupational Therapy   Evaluation and Discharge Note    Name: Sandy Uriarte  MRN: [id number]  Admitting Diagnosis:  NSTEMI (non-ST elevated myocardial infarction)      Recommendations:     Discharge Recommendations: home(with family assistance 24/7)  Discharge Equipment Recommendations:  hospital bed, lift device  Barriers to discharge:  None    Assessment:     Sandy Uriarte is a 85 y.o. female with a medical diagnosis of NSTEMI (non-ST elevated myocardial infarction). At this time, patient is functioning at their prior level of function and does not require further acute OT services.     Plan:     During this hospitalization, patient does not require further acute OT services.  Please re-consult if situation changes.    · Plan of Care Reviewed with: patient, daughter    Subjective     Chief Complaint: "My leg hurts but I don't know why."  Patient/Family Comments/goals: to return home    Occupational Profile:  Living Environment: has a house, however been splitting her time between her daughters' houses  Previous level of function: was modified independent  Roles and Routines: up until 3 weeks prior, patient had a fall and has been in her bed since then  Equipment Used at home:  wheelchair  Assistance upon Discharge: from her daughters and home health    Pain/Comfort:  · Pain Rating 1: (c/o pain in L LE, unable to rate)    Patients cultural, spiritual, [employer] conflicts given the current situation: none    Objective:     Communicated with: nurse prior to session.  Patient found supine with telemetry, peripheral IV upon OT entry to room.    General Precautions: Standard, fall   Orthopedic Precautions:N/A   Braces: N/A     Occupational Performance:    Bed Mobility:    · Patient completed Rolling/Turning to Left with  moderate assistance  · Patient completed Rolling/Turning to Right with minimum assistance  · Patient completed Scooting/Bridging with minimum assistance  · Patient completed Supine to Sit with moderate " assistance  · Patient completed Sit to Supine with moderate assistance    Functional Mobility/Transfers:  · Patient completed Sit <> Stand Transfer with maximal assistance  with  rolling walker   · Functional Mobility: once standing patient with posterior lean, unable to take steps    Activities of Daily Living:  · Feeding:  stand by assistance diann flor  · Upper Body Dressing: minimum assistance edge of bed  · Lower Body Dressing: total assistance bed level    Cognitive/Visual Perceptual:  Cognitive/Psychosocial Skills:     -       Oriented to: Person, Place and Situation   -       Follows Commands/attention:Follows one-step commands  -       Communication: clear/fluent  -       Memory: No Deficits noted  -       Safety awareness/insight to disability: intact   -       Mood/Affect/Coping skills/emotional control: Appropriate to situation    Physical Exam:  Balance:    -       sit balance fair plus; standing balance poor  Postural examination/scapula alignment:    -       Rounded shoulders  Skin integrity: Visible skin intact  Upper Extremity Range of Motion:     -       Right Upper Extremity: WFL  -       Left Upper Extremity: WFL  Upper Extremity Strength:    -       Right Upper Extremity: WFL  -       Left Upper Extremity: WFL    AMPAC 6 Click ADL:  AMPAC Total Score: 15    Treatment & Education:  Evaluation   Education:    Patient left supine with all lines intact, call button in reach and n urse notified    GOALS:   Multidisciplinary Problems     Occupational Therapy Goals     Not on file          Multidisciplinary Problems (Resolved)        Problem: Occupational Therapy Goal    Goal Priority Disciplines Outcome Interventions   Occupational Therapy Goal   (Resolved)     OT, PT/OT Met                    History:     Past Medical History:   Diagnosis Date    Cancer     breast cancer    Diabetes mellitus     Hypertension     Neuropathy     Other and unspecified hyperlipidemia     Thyroid disease        Past  Surgical History:   Procedure Laterality Date    BACK SURGERY      CHOLECYSTECTOMY      HYSTERECTOMY      KNEE SURGERY      MASTECTOMY      right    TONSILLECTOMY         Time Tracking:     OT Date of Treatment: 02/03/20  OT Start Time: 1445  OT Stop Time: 1500  OT Total Time (min): 15 min    Billable Minutes:Evaluation 15 minutes with PT    FLAVIO Raines, MS  2/3/2020

## 2020-02-04 NOTE — PLAN OF CARE
02/03/20 1639   Final Note   Assessment Type Final Discharge Note   Anticipated Discharge Disposition Home-Health   What phone number can be called within the next 1-3 days to see how you are doing after discharge?   (see chart)   Hospital Follow Up  Appt(s) scheduled? Yes   Discharge plans and expectations educations in teach back method with documentation complete? Yes   Right Care Referral Info   Facility Name [facility]   Post-Acute Status   Post-Acute Authorization Home Health/Hospice;HME   HME Status Authorization Obtained   Home Health/Hospice Status Authorization Obtained   Discharge Delays None known at this time   Qing Adams, RN, BSN, STN [facility]  2/3/2020

## 2020-02-11 PROBLEM — R11.10 VOMITING: Status: ACTIVE | Noted: 2020-02-11

## 2020-02-11 PROBLEM — N17.9 AKI (ACUTE KIDNEY INJURY): Status: ACTIVE | Noted: 2020-02-11

## 2020-02-11 PROBLEM — N39.0 ACUTE UTI: Status: ACTIVE | Noted: 2020-02-11

## 2020-02-11 NOTE — DISCHARGE SUMMARY
[facility] Hospital Medicine  Discharge Summary      Patient Name: Sandy Uriarte  MRN: [id number]  Admission Date: 1/31/2020  Hospital Length of Stay: 3 days  Discharge Date and Time: 2/3/2020  Attending Physician: No att. providers found   Discharging Provider: Lety Rosario MD  Primary Care Provider: Rosendo Lawrence MD      HPI:     Sandy Uriarte is a 85 y.o. female that (in part)  has a past medical history of Cancer, Diabetes mellitus, Hypertension, Neuropathy, Other and unspecified hyperlipidemia, and Thyroid disease.  has a past surgical history that includes Tonsillectomy; Knee surgery; Back surgery; Cholecystectomy; Mastectomy; and Hysterectomy. Presents to [facility] as a transfer patient [facility].    Reason for transfer request per Dr. Patel, transfer center physician :     Ms. Uriarte is an 84yo lady with a past medical history of DM2, CVA and hypothyroidism.  She presented to [facility] on 1/27 complaining of nausea, vomiting and anorexia for several days prior to admission.  Accompanying this was some generalized abdominal pain.  This led to a CT abdomen which revealed thickening of the transverse, descending, and sigmoid colon consistent with colitis.  She had a WBC count of 15 at admission.  She was admitted for infectious colitis and UTI and was placed on Cipro and Flagyl IV.  Rocephin 1g iv q24 was added on 1/28 for possible UTI (urine sample wasnt good, but she had K.pneumo recently on an old cx sensitive to this. Cipro was stopped today due to confusion.     Her other complaint was some dysphagia. She underwent an esophagram on 1/29 which revealed dysmotility without evidence for esophageal obstruction.  She is now combative and confused as well.  She had a CT head with no focal, acute changes.     The sending MD is also concerned due to developing on Tuesday night with some burning in her chest, but had a normal EKG.   While she was getting her esophagram, she developed some flip Ts anterior on the telemetry, so a repeat EKG, though she had no symptoms.  Her troponin was minimally elevated to max of 0.72 and came down to 0.51 on the following exams.  She was started on ASA and therapeutic Lovenox.  She was also begun on beta blockers.     It does take 3-5 days to get the results of an echo, so this was not done.    Additionally the family reports she was having TIA like symptoms with recurrent episodes of left-sided facial weakness and inability to close her eye.  Spontaneous resolution x2 episodes.  Family also reports progressive weakness and decreased mobility since December.    Hospital medicine has been asked to admit to inpatient for further evaluation and treatment.       * No surgery found *      Hospital Course:   Pt transferred from [facility] for cardiac and neurological evaluation. Initially admitted with colitis and AMS, possible CVA. EKG changes noted while undergoing an esophogram and troponins elevated. Transferred to [facility].     Pt continued on flagyl and rocephin. Brain MRI and carotid u/s.  Medical treatment for NSTEMI.     2/2- pt family at bedside and have already met with Cardiology and neurology. They are in agreement with DNR. Order placed. Lapost signed. Will proceed with PT/OT eval prior to dc home- family having difficulty physically handling patient and would like recommendations on any home equipment they made need, pt has HH and will consider AIM. SW notified. Plan for dc tomorrow. Medical therapy - no further interventions   2/3- dc home with HH and AIM. Continue cipro + flagyl to complete course for colitis.      Consults:   Consults (From admission, onward)        Status Ordering Provider     Inpatient consult to Cardiology  Once     Provider:  Rupert Pepper MD    Completed SINAI DANIELS JR     Inpatient consult to Neurology  Once     Provider:  Ari Palomino MD     Completed TODD BAEUCHAMP          No new Assessment & Plan notes have been filed under this hospital service since the last note was generated.  Service: Hospital Medicine    Final Active Diagnoses:    Diagnosis Date Noted POA    PRINCIPAL PROBLEM:  NSTEMI (non-ST elevated myocardial infarction) [I21.4] 01/31/2020 Yes    DNR (do not resuscitate) discussion [Z71.89] 02/02/2020 Not Applicable    TIA (transient ischemic attack) [G45.9] 02/01/2020 Yes    Hypomagnesemia [E83.42] 02/01/2020 Yes    Acute colitis [K52.9] 01/31/2020 Yes    Diabetes mellitus, type 2 [E11.9] 01/31/2020 Yes    Essential hypertension [I10] 01/31/2020 Yes    Thyroid dysfunction [E07.9] 01/31/2020 Yes    Esophageal dysmotility [K22.4] 01/31/2020 Yes    Abnormal CT of the abdomen [R93.5] 01/31/2020 Yes      Problems Resolved During this Admission:       Discharged Condition: good    Disposition: Home-Health Care [facility]    Follow Up:  Follow-up Information     Schedule an appointment as soon as possible for a visit with Rosendo Lawrence MD.    Specialty:  Family Medicine  Contact information:  [address] [facility]  [place] [address]  [phone]             [facility] On 2/4/2020.    Specialty:  Home Health Services  Why:  Home Health  Contact information:  [address]  [place] [address]  [phone]             [facility] On 2/4/2020.    Specialty:  DME Provider  Why:  DME: hospital  bed and abby lift to be delivered to home  [facility] information:  [facility] [address]  [phone]                 Patient Instructions:      PATIENT (ABBY) LIFT FOR HOME USE     Order Specific Question Answer Comments   Height: 5' (1.524 m)    Weight: 56.1 kg (123 lb 10.9 oz)    Does patient have medical equipment at home? wheelchair    Length of need (1-99 months): 99      HOSPITAL BED FOR HOME USE     Order Specific Question Answer Comments   Type: Semi-electric    Length of need  (1-99 months): 99    Does patient have medical equipment at home? wheelchair    Height: 5' (1.524 m)    Weight: 56.1 kg (123 lb 10.9 oz)    Please check all that apply: Patient requires positioning of the body in ways not feasible in an ordinary bed due to a medical condition which is expected to last at least one month.      Diet Cardiac     Diet diabetic     Notify your health care provider if you experience any of the following:  temperature >100.4     Notify your health care provider if you experience any of the following:  persistent nausea and vomiting or diarrhea     Notify your health care provider if you experience any of the following:  persistent dizziness, light-headedness, or visual disturbances     Activity as tolerated       Pending Diagnostic Studies:     None         Medications:  Reconciled Home Medications:      Medication List      START taking these medications    aspirin 81 MG EC tablet  Commonly known as:  ECOTRIN  Take 1 tablet (81 mg total) by mouth once daily.     clopidogreL 75 mg tablet  Commonly known as:  PLAVIX  Take 1 tablet (75 mg total) by mouth once daily.     levothyroxine 50 MCG tablet  Commonly known as:  SYNTHROID  Take 1 tablet (50 mcg total) by mouth before breakfast.     lisinopril 10 MG tablet  Take 1 tablet (10 mg total) by mouth once daily.     melatonin 3 mg tablet  Commonly known as:  MELATIN  Take 3 tablets (9 mg total) by mouth nightly as needed for Insomnia.        CONTINUE taking these medications    buPROPion 150 MG TB24 tablet  Commonly known as:  WELLBUTRIN XL  1 daily for mood     glimepiride 2 MG tablet  Commonly known as:  AMARYL  Take 1 tablet(s) every day by oral route.     metFORMIN 500 MG tablet  Commonly known as:  GLUCOPHAGE  take one-half tablet by mouth 4 times daily for diabetes     metoprolol succinate 50 MG 24 hr tablet  Commonly known as:  TOPROL-XL  take one and one-half tablet by mouth daily for high blood pressure     oxybutynin 5 MG  Tab  Commonly known as:  DITROPAN  Take 5 mg by mouth 2 (two) times daily.     simvastatin 20 MG tablet  Commonly known as:  ZOCOR  20 mg every evening.        STOP taking these medications    cyanocobalamin 1,000 mcg/mL injection     diphenoxylate-atropine 2.5-0.025 mg 2.5-0.025 mg per tablet  Commonly known as:  LOMOTIL     sulfamethoxazole-trimethoprim 800-160mg 800-160 mg Tab  Commonly known as:  BACTRIM DS        ASK your doctor about these medications    ciprofloxacin HCl 500 MG tablet  Commonly known as:  CIPRO  Take 1 tablet (500 mg total) by mouth every 12 (twelve) hours. for 7 days  Ask about: Should I take this medication?     metroNIDAZOLE 500 MG tablet  Commonly known as:  FLAGYL  Take 1 tablet (500 mg total) by mouth every 8 (eight) hours. for 7 days  Ask about: Should I take this medication?            Indwelling Lines/Drains at time of discharge:   Lines/Drains/Airways     None                 Time spent on the discharge of patient: 40 minutes  Patient was seen and examined on the date of discharge and determined to be suitable for discharge.         Lety Rosario MD  Department of Hospital Medicine  [facility]

## 2020-02-12 PROBLEM — R32 INCONTINENCE: Status: ACTIVE | Noted: 2020-02-12

## 2020-02-12 PROBLEM — L25.8 INCONTINENCE ASSOCIATED DERMATITIS: Status: ACTIVE | Noted: 2020-02-12

## 2020-02-12 PROBLEM — R32 INCONTINENCE ASSOCIATED DERMATITIS: Status: ACTIVE | Noted: 2020-02-12

## 2020-02-13 PROBLEM — E87.1 HYPONATREMIA: Status: ACTIVE | Noted: 2020-02-13

## 2020-02-27 PROBLEM — E63.9 INADEQUATE DIETARY ENERGY INTAKE: Status: ACTIVE | Noted: 2020-02-27

## 2020-02-27 PROBLEM — E44.0 MODERATE PROTEIN-CALORIE MALNUTRITION: Status: ACTIVE | Noted: 2020-02-27

## 2020-02-27 PROBLEM — R13.19 DYSPHAGIA, NEUROLOGIC: Status: ACTIVE | Noted: 2020-02-27

## 2020-02-27 PROBLEM — R13.10 DYSPHAGIA: Status: ACTIVE | Noted: 2020-02-27

## 2020-02-27 PROBLEM — R56.9 SEIZURE: Status: ACTIVE | Noted: 2020-02-27

## 2023-01-24 NOTE — HPI
Sandy Uriarte is a 85 y.o. female that (in part)  has a past medical history of Cancer, Diabetes mellitus, Hypertension, Neuropathy, Other and unspecified hyperlipidemia, and Thyroid disease.  has a past surgical history that includes Tonsillectomy; Knee surgery; Back surgery; Cholecystectomy; Mastectomy; and Hysterectomy. Presents to [facility] as a transfer patient [facility].    Reason for transfer request per Dr. Patel, transfer center physician :     Ms. Uriarte is an 84yo lady with a past medical history of DM2, CVA and hypothyroidism.  She presented to [facility] on 1/27 complaining of nausea, vomiting and anorexia for several days prior to admission.  Accompanying this was some generalized abdominal pain.  This led to a CT abdomen which revealed thickening of the transverse, descending, and sigmoid colon consistent with colitis.  She had a WBC count of 15 at admission.  She was admitted for infectious colitis and UTI and was placed on Cipro and Flagyl IV.  Rocephin 1g iv q24 was added on 1/28 for possible UTI (urine sample wasnt good, but she had K.pneumo recently on an old cx sensitive to this. Cipro was stopped today due to confusion.     Her other complaint was some dysphagia. She underwent an esophagram on 1/29 which revealed dysmotility without evidence for esophageal obstruction.  She is now combative and confused as well.  She had a CT head with no focal, acute changes.     The sending MD is also concerned due to developing on Tuesday night with some burning in her chest, but had a normal EKG.  While she was getting her esophagram, she developed some flip Ts anterior on the telemetry, so a repeat EKG, though she had no symptoms.  Her troponin was minimally elevated to max of 0.72 and came down to 0.51 on the following exams.  She was started on ASA and therapeutic Lovenox.  She was also begun on beta blockers.     It does take 3-5 days to get  the results of an echo, so this was not done.    Additionally the family reports she was having TIA like symptoms with recurrent episodes of left-sided facial weakness and inability to close her eye.  Spontaneous resolution x2 episodes.  Family also reports progressive weakness and decreased mobility since December.    Hospital medicine has been asked to admit to inpatient for further evaluation and treatment.      Olumiant Counseling: I discussed with the patient the risks of Olumiant therapy including but not limited to upper respiratory tract infections, shingles, cold sores, and nausea. Live vaccines should be avoided.  This medication has been linked to serious infections; higher rate of mortality; malignancy and lymphoproliferative disorders; major adverse cardiovascular events; thrombosis; gastrointestinal perforations; neutropenia; lymphopenia; anemia; liver enzyme elevations; and lipid elevations.